# Patient Record
Sex: FEMALE | Race: WHITE | NOT HISPANIC OR LATINO | URBAN - METROPOLITAN AREA
[De-identification: names, ages, dates, MRNs, and addresses within clinical notes are randomized per-mention and may not be internally consistent; named-entity substitution may affect disease eponyms.]

---

## 2017-06-19 ENCOUNTER — OUTPATIENT (OUTPATIENT)
Dept: OUTPATIENT SERVICES | Facility: HOSPITAL | Age: 77
LOS: 1 days | Discharge: HOME | End: 2017-06-19

## 2017-06-19 DIAGNOSIS — K61.0 ANAL ABSCESS: ICD-10-CM

## 2017-06-19 DIAGNOSIS — I48.91 UNSPECIFIED ATRIAL FIBRILLATION: ICD-10-CM

## 2017-06-19 DIAGNOSIS — K62.5 HEMORRHAGE OF ANUS AND RECTUM: ICD-10-CM

## 2017-06-24 ENCOUNTER — EMERGENCY (EMERGENCY)
Facility: HOSPITAL | Age: 77
LOS: 0 days | Discharge: HOME | End: 2017-06-24

## 2017-06-24 DIAGNOSIS — K61.0 ANAL ABSCESS: ICD-10-CM

## 2017-06-24 DIAGNOSIS — I48.91 UNSPECIFIED ATRIAL FIBRILLATION: ICD-10-CM

## 2017-06-24 DIAGNOSIS — K62.5 HEMORRHAGE OF ANUS AND RECTUM: ICD-10-CM

## 2017-06-26 ENCOUNTER — EMERGENCY (EMERGENCY)
Facility: HOSPITAL | Age: 77
LOS: 0 days | Discharge: HOME | End: 2017-06-26
Admitting: INTERNAL MEDICINE

## 2017-06-26 DIAGNOSIS — K61.0 ANAL ABSCESS: ICD-10-CM

## 2017-06-26 DIAGNOSIS — Z02.9 ENCOUNTER FOR ADMINISTRATIVE EXAMINATIONS, UNSPECIFIED: ICD-10-CM

## 2017-06-26 DIAGNOSIS — K62.5 HEMORRHAGE OF ANUS AND RECTUM: ICD-10-CM

## 2017-06-26 DIAGNOSIS — I48.91 UNSPECIFIED ATRIAL FIBRILLATION: ICD-10-CM

## 2017-06-27 ENCOUNTER — INPATIENT (INPATIENT)
Facility: HOSPITAL | Age: 77
LOS: 2 days | Discharge: HOME | End: 2017-06-30
Attending: INTERNAL MEDICINE | Admitting: INTERNAL MEDICINE

## 2017-06-27 DIAGNOSIS — I48.91 UNSPECIFIED ATRIAL FIBRILLATION: ICD-10-CM

## 2017-06-27 DIAGNOSIS — K61.0 ANAL ABSCESS: ICD-10-CM

## 2017-06-27 DIAGNOSIS — K62.5 HEMORRHAGE OF ANUS AND RECTUM: ICD-10-CM

## 2017-06-28 DIAGNOSIS — Z02.9 ENCOUNTER FOR ADMINISTRATIVE EXAMINATIONS, UNSPECIFIED: ICD-10-CM

## 2017-06-28 DIAGNOSIS — D51.8 OTHER VITAMIN B12 DEFICIENCY ANEMIAS: ICD-10-CM

## 2017-06-28 DIAGNOSIS — E55.9 VITAMIN D DEFICIENCY, UNSPECIFIED: ICD-10-CM

## 2017-06-28 DIAGNOSIS — D53.9 NUTRITIONAL ANEMIA, UNSPECIFIED: ICD-10-CM

## 2017-06-28 DIAGNOSIS — E11.9 TYPE 2 DIABETES MELLITUS WITHOUT COMPLICATIONS: ICD-10-CM

## 2017-06-28 DIAGNOSIS — E78.5 HYPERLIPIDEMIA, UNSPECIFIED: ICD-10-CM

## 2017-06-28 DIAGNOSIS — E03.9 HYPOTHYROIDISM, UNSPECIFIED: ICD-10-CM

## 2017-06-28 DIAGNOSIS — N39.0 URINARY TRACT INFECTION, SITE NOT SPECIFIED: ICD-10-CM

## 2017-07-02 ENCOUNTER — INPATIENT (INPATIENT)
Facility: HOSPITAL | Age: 77
LOS: 4 days | Discharge: HOME | End: 2017-07-07
Attending: INTERNAL MEDICINE

## 2017-07-02 DIAGNOSIS — I48.91 UNSPECIFIED ATRIAL FIBRILLATION: ICD-10-CM

## 2017-07-02 DIAGNOSIS — K62.5 HEMORRHAGE OF ANUS AND RECTUM: ICD-10-CM

## 2017-07-02 DIAGNOSIS — K61.0 ANAL ABSCESS: ICD-10-CM

## 2017-07-05 DIAGNOSIS — E87.1 HYPO-OSMOLALITY AND HYPONATREMIA: ICD-10-CM

## 2017-07-05 DIAGNOSIS — I12.9 HYPERTENSIVE CHRONIC KIDNEY DISEASE WITH STAGE 1 THROUGH STAGE 4 CHRONIC KIDNEY DISEASE, OR UNSPECIFIED CHRONIC KIDNEY DISEASE: ICD-10-CM

## 2017-07-05 DIAGNOSIS — N39.0 URINARY TRACT INFECTION, SITE NOT SPECIFIED: ICD-10-CM

## 2017-07-05 DIAGNOSIS — Z79.01 LONG TERM (CURRENT) USE OF ANTICOAGULANTS: ICD-10-CM

## 2017-07-05 DIAGNOSIS — N17.9 ACUTE KIDNEY FAILURE, UNSPECIFIED: ICD-10-CM

## 2017-07-05 DIAGNOSIS — N18.3 CHRONIC KIDNEY DISEASE, STAGE 3 (MODERATE): ICD-10-CM

## 2017-07-05 DIAGNOSIS — R53.1 WEAKNESS: ICD-10-CM

## 2017-07-05 DIAGNOSIS — I48.91 UNSPECIFIED ATRIAL FIBRILLATION: ICD-10-CM

## 2017-07-05 DIAGNOSIS — Z90.710 ACQUIRED ABSENCE OF BOTH CERVIX AND UTERUS: ICD-10-CM

## 2017-07-05 DIAGNOSIS — Z88.6 ALLERGY STATUS TO ANALGESIC AGENT: ICD-10-CM

## 2017-07-05 DIAGNOSIS — K59.00 CONSTIPATION, UNSPECIFIED: ICD-10-CM

## 2017-07-05 DIAGNOSIS — N76.4 ABSCESS OF VULVA: ICD-10-CM

## 2017-07-05 DIAGNOSIS — Z98.1 ARTHRODESIS STATUS: ICD-10-CM

## 2017-07-05 DIAGNOSIS — L03.317 CELLULITIS OF BUTTOCK: ICD-10-CM

## 2017-07-05 DIAGNOSIS — K61.1 RECTAL ABSCESS: ICD-10-CM

## 2017-07-05 DIAGNOSIS — E86.9 VOLUME DEPLETION, UNSPECIFIED: ICD-10-CM

## 2017-07-05 DIAGNOSIS — L02.31 CUTANEOUS ABSCESS OF BUTTOCK: ICD-10-CM

## 2017-07-11 DIAGNOSIS — D64.9 ANEMIA, UNSPECIFIED: ICD-10-CM

## 2017-07-11 DIAGNOSIS — K62.5 HEMORRHAGE OF ANUS AND RECTUM: ICD-10-CM

## 2017-07-11 DIAGNOSIS — D12.2 BENIGN NEOPLASM OF ASCENDING COLON: ICD-10-CM

## 2017-07-11 DIAGNOSIS — I12.9 HYPERTENSIVE CHRONIC KIDNEY DISEASE WITH STAGE 1 THROUGH STAGE 4 CHRONIC KIDNEY DISEASE, OR UNSPECIFIED CHRONIC KIDNEY DISEASE: ICD-10-CM

## 2017-07-11 DIAGNOSIS — D12.0 BENIGN NEOPLASM OF CECUM: ICD-10-CM

## 2017-07-11 DIAGNOSIS — K64.4 RESIDUAL HEMORRHOIDAL SKIN TAGS: ICD-10-CM

## 2017-07-11 DIAGNOSIS — K59.00 CONSTIPATION, UNSPECIFIED: ICD-10-CM

## 2017-07-11 DIAGNOSIS — N18.3 CHRONIC KIDNEY DISEASE, STAGE 3 (MODERATE): ICD-10-CM

## 2017-07-11 DIAGNOSIS — E87.1 HYPO-OSMOLALITY AND HYPONATREMIA: ICD-10-CM

## 2017-07-11 DIAGNOSIS — I48.91 UNSPECIFIED ATRIAL FIBRILLATION: ICD-10-CM

## 2017-07-11 DIAGNOSIS — E03.9 HYPOTHYROIDISM, UNSPECIFIED: ICD-10-CM

## 2017-07-21 DIAGNOSIS — K64.8 OTHER HEMORRHOIDS: ICD-10-CM

## 2017-07-21 DIAGNOSIS — K92.2 GASTROINTESTINAL HEMORRHAGE, UNSPECIFIED: ICD-10-CM

## 2019-06-10 PROBLEM — Z00.00 ENCOUNTER FOR PREVENTIVE HEALTH EXAMINATION: Status: ACTIVE | Noted: 2019-06-10

## 2019-07-10 ENCOUNTER — APPOINTMENT (OUTPATIENT)
Dept: CARDIOLOGY | Facility: CLINIC | Age: 79
End: 2019-07-10

## 2019-08-01 ENCOUNTER — INPATIENT (INPATIENT)
Facility: HOSPITAL | Age: 79
LOS: 3 days | Discharge: HOME HEALTH | End: 2019-08-05
Attending: STUDENT IN AN ORGANIZED HEALTH CARE EDUCATION/TRAINING PROGRAM | Admitting: INTERNAL MEDICINE
Payer: MEDICARE

## 2019-08-01 VITALS
DIASTOLIC BLOOD PRESSURE: 76 MMHG | OXYGEN SATURATION: 97 % | TEMPERATURE: 98 F | HEART RATE: 88 BPM | RESPIRATION RATE: 16 BRPM | SYSTOLIC BLOOD PRESSURE: 139 MMHG

## 2019-08-01 DIAGNOSIS — E03.9 HYPOTHYROIDISM, UNSPECIFIED: ICD-10-CM

## 2019-08-01 DIAGNOSIS — I10 ESSENTIAL (PRIMARY) HYPERTENSION: ICD-10-CM

## 2019-08-01 DIAGNOSIS — M10.9 GOUT, UNSPECIFIED: ICD-10-CM

## 2019-08-01 DIAGNOSIS — I61.9 NONTRAUMATIC INTRACEREBRAL HEMORRHAGE, UNSPECIFIED: ICD-10-CM

## 2019-08-01 DIAGNOSIS — I48.91 UNSPECIFIED ATRIAL FIBRILLATION: ICD-10-CM

## 2019-08-01 DIAGNOSIS — N30.80 OTHER CYSTITIS WITHOUT HEMATURIA: ICD-10-CM

## 2019-08-01 LAB
ALBUMIN SERPL ELPH-MCNC: 3.5 G/DL — SIGNIFICANT CHANGE UP (ref 3.5–5.2)
ALP SERPL-CCNC: 90 U/L — SIGNIFICANT CHANGE UP (ref 30–115)
ALT FLD-CCNC: 11 U/L — SIGNIFICANT CHANGE UP (ref 0–41)
ANION GAP SERPL CALC-SCNC: 13 MMOL/L — SIGNIFICANT CHANGE UP (ref 7–14)
APPEARANCE UR: ABNORMAL
APTT BLD: 25.4 SEC — LOW (ref 27–39.2)
AST SERPL-CCNC: 19 U/L — SIGNIFICANT CHANGE UP (ref 0–41)
BACTERIA # UR AUTO: ABNORMAL /HPF
BASOPHILS # BLD AUTO: 0.03 K/UL — SIGNIFICANT CHANGE UP (ref 0–0.2)
BASOPHILS NFR BLD AUTO: 0.5 % — SIGNIFICANT CHANGE UP (ref 0–1)
BILIRUB SERPL-MCNC: 0.4 MG/DL — SIGNIFICANT CHANGE UP (ref 0.2–1.2)
BILIRUB UR-MCNC: NEGATIVE — SIGNIFICANT CHANGE UP
BUN SERPL-MCNC: 15 MG/DL — SIGNIFICANT CHANGE UP (ref 10–20)
CALCIUM SERPL-MCNC: 9.3 MG/DL — SIGNIFICANT CHANGE UP (ref 8.5–10.1)
CHLORIDE SERPL-SCNC: 101 MMOL/L — SIGNIFICANT CHANGE UP (ref 98–110)
CO2 SERPL-SCNC: 25 MMOL/L — SIGNIFICANT CHANGE UP (ref 17–32)
COD CRY URNS QL: NEGATIVE — SIGNIFICANT CHANGE UP
COLOR SPEC: YELLOW — SIGNIFICANT CHANGE UP
CREAT SERPL-MCNC: 1 MG/DL — SIGNIFICANT CHANGE UP (ref 0.7–1.5)
DIFF PNL FLD: ABNORMAL
EOSINOPHIL # BLD AUTO: 0.06 K/UL — SIGNIFICANT CHANGE UP (ref 0–0.7)
EOSINOPHIL NFR BLD AUTO: 0.9 % — SIGNIFICANT CHANGE UP (ref 0–8)
EPI CELLS # UR: ABNORMAL /HPF
GLUCOSE SERPL-MCNC: 99 MG/DL — SIGNIFICANT CHANGE UP (ref 70–99)
GLUCOSE UR QL: NEGATIVE MG/DL — SIGNIFICANT CHANGE UP
GRAN CASTS # UR COMP ASSIST: NEGATIVE — SIGNIFICANT CHANGE UP
HCT VFR BLD CALC: 40.8 % — SIGNIFICANT CHANGE UP (ref 37–47)
HGB BLD-MCNC: 13 G/DL — SIGNIFICANT CHANGE UP (ref 12–16)
HYALINE CASTS # UR AUTO: NEGATIVE — SIGNIFICANT CHANGE UP
IMM GRANULOCYTES NFR BLD AUTO: 0.3 % — SIGNIFICANT CHANGE UP (ref 0.1–0.3)
INR BLD: 1.03 RATIO — SIGNIFICANT CHANGE UP (ref 0.65–1.3)
KETONES UR-MCNC: ABNORMAL
LACTATE SERPL-SCNC: 1.1 MMOL/L — SIGNIFICANT CHANGE UP (ref 0.5–2.2)
LEUKOCYTE ESTERASE UR-ACNC: SIGNIFICANT CHANGE UP
LIDOCAIN IGE QN: 21 U/L — SIGNIFICANT CHANGE UP (ref 7–60)
LYMPHOCYTES # BLD AUTO: 1.72 K/UL — SIGNIFICANT CHANGE UP (ref 1.2–3.4)
LYMPHOCYTES # BLD AUTO: 26.7 % — SIGNIFICANT CHANGE UP (ref 20.5–51.1)
MCHC RBC-ENTMCNC: 30.2 PG — SIGNIFICANT CHANGE UP (ref 27–31)
MCHC RBC-ENTMCNC: 31.9 G/DL — LOW (ref 32–37)
MCV RBC AUTO: 94.7 FL — SIGNIFICANT CHANGE UP (ref 81–99)
MONOCYTES # BLD AUTO: 0.58 K/UL — SIGNIFICANT CHANGE UP (ref 0.1–0.6)
MONOCYTES NFR BLD AUTO: 9 % — SIGNIFICANT CHANGE UP (ref 1.7–9.3)
NEUTROPHILS # BLD AUTO: 4.02 K/UL — SIGNIFICANT CHANGE UP (ref 1.4–6.5)
NEUTROPHILS NFR BLD AUTO: 62.6 % — SIGNIFICANT CHANGE UP (ref 42.2–75.2)
NITRITE UR-MCNC: POSITIVE
NRBC # BLD: 0 /100 WBCS — SIGNIFICANT CHANGE UP (ref 0–0)
PH UR: 7 — SIGNIFICANT CHANGE UP (ref 5–8)
PLATELET # BLD AUTO: 184 K/UL — SIGNIFICANT CHANGE UP (ref 130–400)
POTASSIUM SERPL-MCNC: 4.1 MMOL/L — SIGNIFICANT CHANGE UP (ref 3.5–5)
POTASSIUM SERPL-SCNC: 4.1 MMOL/L — SIGNIFICANT CHANGE UP (ref 3.5–5)
PROT SERPL-MCNC: 6.8 G/DL — SIGNIFICANT CHANGE UP (ref 6–8)
PROT UR-MCNC: 100 MG/DL
PROTHROM AB SERPL-ACNC: 11.8 SEC — SIGNIFICANT CHANGE UP (ref 9.95–12.87)
RBC # BLD: 4.31 M/UL — SIGNIFICANT CHANGE UP (ref 4.2–5.4)
RBC # FLD: 14 % — SIGNIFICANT CHANGE UP (ref 11.5–14.5)
RBC CASTS # UR COMP ASSIST: SIGNIFICANT CHANGE UP /HPF
SODIUM SERPL-SCNC: 139 MMOL/L — SIGNIFICANT CHANGE UP (ref 135–146)
SP GR SPEC: 1.01 — SIGNIFICANT CHANGE UP (ref 1.01–1.03)
TRI-PHOS CRY UR QL COMP ASSIST: NEGATIVE — SIGNIFICANT CHANGE UP
URATE CRY FLD QL MICRO: NEGATIVE — SIGNIFICANT CHANGE UP
UROBILINOGEN FLD QL: 0.2 MG/DL — SIGNIFICANT CHANGE UP (ref 0.2–0.2)
WBC # BLD: 6.43 K/UL — SIGNIFICANT CHANGE UP (ref 4.8–10.8)
WBC # FLD AUTO: 6.43 K/UL — SIGNIFICANT CHANGE UP (ref 4.8–10.8)
WBC UR QL: ABNORMAL /HPF

## 2019-08-01 PROCEDURE — 70450 CT HEAD/BRAIN W/O DYE: CPT | Mod: 26

## 2019-08-01 PROCEDURE — 74176 CT ABD & PELVIS W/O CONTRAST: CPT | Mod: 26

## 2019-08-01 PROCEDURE — 99285 EMERGENCY DEPT VISIT HI MDM: CPT

## 2019-08-01 PROCEDURE — 99221 1ST HOSP IP/OBS SF/LOW 40: CPT

## 2019-08-01 RX ORDER — CEFTRIAXONE 500 MG/1
1000 INJECTION, POWDER, FOR SOLUTION INTRAMUSCULAR; INTRAVENOUS ONCE
Refills: 0 | Status: COMPLETED | OUTPATIENT
Start: 2019-08-01 | End: 2019-08-01

## 2019-08-01 RX ADMIN — CEFTRIAXONE 100 MILLIGRAM(S): 500 INJECTION, POWDER, FOR SOLUTION INTRAMUSCULAR; INTRAVENOUS at 20:53

## 2019-08-01 NOTE — ED PROVIDER NOTE - OBJECTIVE STATEMENT
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 Pt states this am she felt just generalized weakness and overall malaise. pt endorses mild intermittent dizziness and HA that is unchanged from stroke in march. Pt states doctor came yesterday and was concerned for UTI, she took a urine sample and started her on macrobid. pt took 1 dose today. pt denies f/c/n/v/d, abd pain, fall, cp, sob, urinary symptoms, new focal deficits. pt is nonambulatory at baseline.

## 2019-08-01 NOTE — H&P ADULT - HISTORY OF PRESENT ILLNESS
· HPI Objective Statement: The pt is a 79y Female with PMH hemorrhagic stroke in March with residual right sided deficits, HTN, hypothyroidism, afib on plavix is presenting to ED with generalized weakness x 1 day. Pt states this am she felt just generalized weakness and overall malaise. pt endorses mild intermittent dizziness and HA that is unchanged from stroke in march. Pt states doctor came yesterday and was concerned for UTI, she took a urine sample and started her on macrobid. pt took 1 dose today. pt denies f/c/n/v/d, abd pain, fall, cp, sob, urinary symptoms, new focal deficits. pt is nonambulatory at baseline.	    PAST MEDICAL/SURGICAL/FAMILY/SOCIAL HISTORY:    Past [facility]

## 2019-08-01 NOTE — H&P ADULT - ASSESSMENT
Patient is a 79y old  Female who presents with a chief complaint of    urinary symptoms/  general weakness                                                                                                                                                                                                                                                                                    HEALTH ISSUES - PROBLEM Dx:

## 2019-08-01 NOTE — ED PROVIDER NOTE - PHYSICAL EXAMINATION
GEN: Alert & Oriented x 3, No acute distress. Calm, appropriate.  Head and Neck: Normocephalic, atraumatic. No cervical lymphadenopathy.   ENT:Oral mucosa pink, moist without lesions.   Eyes: PERRL. EOMI. no pale conjunctiva. No conjunctival injection. No scleral icterus.   RESP: Lungs clear to auscult bilat. no wheezes, rhonchi or rales. No retractions. Equal air entry.  CARDIO: regular rate and rhythm, no murmurs, rubs or gallops. Normal S1, S2. Radial pulses 2+ bilaterally. No lower extremity edema.  ABD: Soft, Nondistended. No rebound tenderness/guarding.  No pulsatile mass. Tenderness with palpation to RLQ. no tenderness to LLQ/RUQ/LUQ.   SKIN: no rashes/lesions, no petechiae, no ecchymosis.  Neuro: A&O x3, CN II- XII intact, strength 5/5 throughout left extremities, decreased strength and movement to right upper and lower extremities at baseline. sensation intact . Speech & mentation intact. No drooping of eye or mouth. Without dysarthria or aphasia. Finger to nose intact left side.

## 2019-08-01 NOTE — ED PROVIDER NOTE - PROGRESS NOTE DETAILS
80yo F PMH noted, presents c/o weakness. Pt with a h/o hemorrhagic CVA 3/7/19 with residual right sided weakness. Today as per family she was sitting with a blank stare for a while, also today she had light dizziness and lower abdominal pain. Pt denies any fever, CP, SOB, nausea, vomiting. On exam pt in NAD, AAO x3, PERRL, EOMI, no lad, neck supple, OP clear, MMM, Lungs CTA B/L, no wrr, no mur, Abd is soft, + BS, NT ND, no edema, good ROM x all ext, no rash, steady gait

## 2019-08-01 NOTE — H&P ADULT - NSHPREVIEWOFSYSTEMS_GEN_ALL_CORE
view of Systems:  · Review of Systems: GEN: (-) fever, (-) night sweats, (+) malaise  	HEENT: (-) vision changes, (-) HA   	CV: (-) chest pain, (-) palpitations, (-) edema  	PULM: (-) cough, (-) wheezing, (-) dyspnea  	GI: (-) abdominal pain,(-) Nausea, (-) Vomiting, (-) Diarrhea, (-) Melena  	NEURO: (+) weakness, (-) paresthesias, (-) syncope, (-) seizure  	: (-) dysuria, (-) frequency, (-) urgency, (-) hematuria  	MS: (-) back pain, (-) joint pain, (-)myalgias, (-) swelling  	SKIN: (-) rashes, (-) new lesions, (-) pruritus, (-) jaundice  HEME: (-) bleeding, (-) ecchymosis

## 2019-08-01 NOTE — ED PROVIDER NOTE - ATTENDING CONTRIBUTION TO CARE
No
78yo F PMH noted including a fib, hemorrhagic CVA, (right sided residual weakness), HTN presents with family from home with c/o weakness. Today as per family she was sitting with a blank stare for a while, also today she had slight dizziness and lower abdominal pain. Pt denies any fever, CP, SOB, nausea, vomiting. Had urine tested yesterday by home care and told + UTI, took 1 dose of Macrobid,  On exam pt in NAD, AAO x3, PERRL, EOMI, no lad, neck supple, OP clear, MMM, Lungs CTA B/L, no wrr, no mur, Abd is soft, + BS, NT ND, no edema, good ROM x all ext, no rash,

## 2019-08-01 NOTE — H&P ADULT - NSICDXPASTMEDICALHX_GEN_ALL_CORE_FT
PAST MEDICAL HISTORY:  Afib     Gout     Hemorrhagic stroke     HTN (hypertension)     Hypothyroidism

## 2019-08-01 NOTE — ED PROVIDER NOTE - NS ED ROS FT
GEN: (-) fever, (-) night sweats, (+) malaise  HEENT: (-) vision changes, (-) HA   CV: (-) chest pain, (-) palpitations, (-) edema  PULM: (-) cough, (-) wheezing, (-) dyspnea  GI: (-) abdominal pain,(-) Nausea, (-) Vomiting, (-) Diarrhea, (-) Melena  NEURO: (+) weakness, (-) paresthesias, (-) syncope, (-) seizure  : (-) dysuria, (-) frequency, (-) urgency, (-) hematuria  MS: (-) back pain, (-) joint pain, (-)myalgias, (-) swelling  SKIN: (-) rashes, (-) new lesions, (-) pruritus, (-) jaundice  HEME: (-) bleeding, (-) ecchymosis

## 2019-08-02 LAB — TROPONIN T SERPL-MCNC: <0.01 NG/ML — SIGNIFICANT CHANGE UP

## 2019-08-02 PROCEDURE — 99233 SBSQ HOSP IP/OBS HIGH 50: CPT

## 2019-08-02 PROCEDURE — 76775 US EXAM ABDO BACK WALL LIM: CPT | Mod: 26

## 2019-08-02 RX ORDER — DOCUSATE SODIUM 100 MG
100 CAPSULE ORAL THREE TIMES A DAY
Refills: 0 | Status: DISCONTINUED | OUTPATIENT
Start: 2019-08-01 | End: 2019-08-05

## 2019-08-02 RX ORDER — FLECAINIDE ACETATE 50 MG
50 TABLET ORAL
Refills: 0 | Status: DISCONTINUED | OUTPATIENT
Start: 2019-08-01 | End: 2019-08-05

## 2019-08-02 RX ORDER — LOSARTAN POTASSIUM 100 MG/1
50 TABLET, FILM COATED ORAL
Refills: 0 | Status: DISCONTINUED | OUTPATIENT
Start: 2019-08-01 | End: 2019-08-05

## 2019-08-02 RX ORDER — SODIUM CHLORIDE 9 MG/ML
1000 INJECTION, SOLUTION INTRAVENOUS
Refills: 0 | Status: DISCONTINUED | OUTPATIENT
Start: 2019-08-02 | End: 2019-08-04

## 2019-08-02 RX ORDER — CEFTRIAXONE 500 MG/1
1000 INJECTION, POWDER, FOR SOLUTION INTRAMUSCULAR; INTRAVENOUS EVERY 24 HOURS
Refills: 0 | Status: DISCONTINUED | OUTPATIENT
Start: 2019-08-01 | End: 2019-08-04

## 2019-08-02 RX ORDER — AMLODIPINE BESYLATE 2.5 MG/1
5 TABLET ORAL DAILY
Refills: 0 | Status: DISCONTINUED | OUTPATIENT
Start: 2019-08-01 | End: 2019-08-05

## 2019-08-02 RX ORDER — ACETAMINOPHEN 500 MG
650 TABLET ORAL EVERY 6 HOURS
Refills: 0 | Status: DISCONTINUED | OUTPATIENT
Start: 2019-08-02 | End: 2019-08-05

## 2019-08-02 RX ORDER — HEPARIN SODIUM 5000 [USP'U]/ML
5000 INJECTION INTRAVENOUS; SUBCUTANEOUS EVERY 12 HOURS
Refills: 0 | Status: DISCONTINUED | OUTPATIENT
Start: 2019-08-01 | End: 2019-08-05

## 2019-08-02 RX ORDER — POLYETHYLENE GLYCOL 3350 17 G/17G
17 POWDER, FOR SOLUTION ORAL
Refills: 0 | Status: DISCONTINUED | OUTPATIENT
Start: 2019-08-02 | End: 2019-08-05

## 2019-08-02 RX ORDER — DOCUSATE SODIUM 100 MG
100 CAPSULE ORAL THREE TIMES A DAY
Refills: 0 | Status: DISCONTINUED | OUTPATIENT
Start: 2019-08-01 | End: 2019-08-02

## 2019-08-02 RX ORDER — SENNA PLUS 8.6 MG/1
2 TABLET ORAL AT BEDTIME
Refills: 0 | Status: DISCONTINUED | OUTPATIENT
Start: 2019-08-01 | End: 2019-08-05

## 2019-08-02 RX ORDER — LEVOTHYROXINE SODIUM 125 MCG
25 TABLET ORAL DAILY
Refills: 0 | Status: DISCONTINUED | OUTPATIENT
Start: 2019-08-01 | End: 2019-08-05

## 2019-08-02 RX ORDER — CLOPIDOGREL BISULFATE 75 MG/1
75 TABLET, FILM COATED ORAL DAILY
Refills: 0 | Status: DISCONTINUED | OUTPATIENT
Start: 2019-08-01 | End: 2019-08-05

## 2019-08-02 RX ORDER — HYDRALAZINE HCL 50 MG
25 TABLET ORAL AT BEDTIME
Refills: 0 | Status: DISCONTINUED | OUTPATIENT
Start: 2019-08-01 | End: 2019-08-05

## 2019-08-02 RX ORDER — HYDRALAZINE HCL 50 MG
50 TABLET ORAL DAILY
Refills: 0 | Status: DISCONTINUED | OUTPATIENT
Start: 2019-08-01 | End: 2019-08-05

## 2019-08-02 RX ORDER — PANTOPRAZOLE SODIUM 20 MG/1
40 TABLET, DELAYED RELEASE ORAL
Refills: 0 | Status: DISCONTINUED | OUTPATIENT
Start: 2019-08-02 | End: 2019-08-05

## 2019-08-02 RX ORDER — SENNA PLUS 8.6 MG/1
2 TABLET ORAL AT BEDTIME
Refills: 0 | Status: DISCONTINUED | OUTPATIENT
Start: 2019-08-01 | End: 2019-08-02

## 2019-08-02 RX ADMIN — Medication 50 MILLIGRAM(S): at 18:31

## 2019-08-02 RX ADMIN — Medication 100 MILLIGRAM(S): at 21:16

## 2019-08-02 RX ADMIN — LOSARTAN POTASSIUM 50 MILLIGRAM(S): 100 TABLET, FILM COATED ORAL at 18:32

## 2019-08-02 RX ADMIN — Medication 25 MICROGRAM(S): at 06:33

## 2019-08-02 RX ADMIN — POLYETHYLENE GLYCOL 3350 17 GRAM(S): 17 POWDER, FOR SOLUTION ORAL at 23:05

## 2019-08-02 RX ADMIN — PANTOPRAZOLE SODIUM 40 MILLIGRAM(S): 20 TABLET, DELAYED RELEASE ORAL at 14:17

## 2019-08-02 RX ADMIN — AMLODIPINE BESYLATE 5 MILLIGRAM(S): 2.5 TABLET ORAL at 06:33

## 2019-08-02 RX ADMIN — LOSARTAN POTASSIUM 50 MILLIGRAM(S): 100 TABLET, FILM COATED ORAL at 06:33

## 2019-08-02 RX ADMIN — HEPARIN SODIUM 5000 UNIT(S): 5000 INJECTION INTRAVENOUS; SUBCUTANEOUS at 18:33

## 2019-08-02 RX ADMIN — HEPARIN SODIUM 5000 UNIT(S): 5000 INJECTION INTRAVENOUS; SUBCUTANEOUS at 06:33

## 2019-08-02 RX ADMIN — Medication 50 MILLIGRAM(S): at 06:33

## 2019-08-02 RX ADMIN — Medication 100 MILLIGRAM(S): at 14:17

## 2019-08-02 RX ADMIN — SODIUM CHLORIDE 75 MILLILITER(S): 9 INJECTION, SOLUTION INTRAVENOUS at 06:37

## 2019-08-02 RX ADMIN — Medication 100 MILLIGRAM(S): at 06:33

## 2019-08-02 RX ADMIN — Medication 25 MILLIGRAM(S): at 21:17

## 2019-08-02 RX ADMIN — Medication 650 MILLIGRAM(S): at 11:22

## 2019-08-02 RX ADMIN — SODIUM CHLORIDE 75 MILLILITER(S): 9 INJECTION, SOLUTION INTRAVENOUS at 21:16

## 2019-08-02 RX ADMIN — CEFTRIAXONE 100 MILLIGRAM(S): 500 INJECTION, POWDER, FOR SOLUTION INTRAMUSCULAR; INTRAVENOUS at 06:35

## 2019-08-02 RX ADMIN — SENNA PLUS 2 TABLET(S): 8.6 TABLET ORAL at 21:16

## 2019-08-02 RX ADMIN — CLOPIDOGREL BISULFATE 75 MILLIGRAM(S): 75 TABLET, FILM COATED ORAL at 11:22

## 2019-08-02 RX ADMIN — POLYETHYLENE GLYCOL 3350 17 GRAM(S): 17 POWDER, FOR SOLUTION ORAL at 14:17

## 2019-08-02 NOTE — CONSULT NOTE ADULT - SUBJECTIVE AND OBJECTIVE BOX
CARDIOLOGY CONSULT NOTE     CHIEF COMPLAINT/REASON FOR CONSULT:    HPI:  · HPI Objective Statement: The pt is a 79y Female with PMH hemorrhagic stroke in March with residual right sided deficits, HTN, hypothyroidism, afib on plavix is presenting to ED with generalized weakness x 1 day. Pt states this am she felt just generalized weakness and overall malaise. pt endorses mild intermittent dizziness and HA that is unchanged from stroke in march. Pt states doctor came yesterday and was concerned for UTI, she took a urine sample and started her on macrobid. pt took 1 dose today. pt denies f/c/n/v/d, abd pain, fall, cp, sob, urinary symptoms, new focal deficits. pt is nonambulatory at baseline.	    PAST MEDICAL/SURGICAL/FAMILY/SOCIAL HISTORY:    [facility] (01 Aug 2019 23:12)      PAST MEDICAL & SURGICAL HISTORY:  Hemorrhagic stroke  Afib  Hypothyroidism  Gout  HTN (hypertension)  No significant past surgical history      Cardiac Risks:   [x ]HTN, [ ] DM, [ ] Smoking, [ ] FH,  [ ] Lipids        MEDICATIONS:  MEDICATIONS  (STANDING):  amLODIPine   Tablet 5 milliGRAM(s) Oral daily  cefTRIAXone   IVPB 1000 milliGRAM(s) IV Intermittent every 24 hours  clopidogrel Tablet 75 milliGRAM(s) Oral daily  dextrose 5% + sodium chloride 0.45%. 1000 milliLiter(s) (75 mL/Hr) IV Continuous <Continuous>  docusate sodium 100 milliGRAM(s) Oral three times a day  flecainide 50 milliGRAM(s) Oral two times a day  heparin  Injectable 5000 Unit(s) SubCutaneous every 12 hours  hydrALAZINE 50 milliGRAM(s) Oral daily  hydrALAZINE 25 milliGRAM(s) Oral at bedtime  levothyroxine 25 MICROGram(s) Oral daily  losartan 50 milliGRAM(s) Oral two times a day  senna 2 Tablet(s) Oral at bedtime      FAMILY HISTORY:      SOCIAL HISTORY:      [ ] Marital status   Allergies    amiodarone (Unknown)  Levaquin (Short breath; Rash)  morphine (Other)        	    REVIEW OF SYSTEMS:  CONSTITUTIONAL: No fever, weight loss, or fatigue  EYES: No eye pain, visual disturbances, or discharge  ENMT:  No difficulty hearing, tinnitus, vertigo; No sinus or throat pain  NECK: No pain or stiffness  RESPIRATORY: No cough, wheezing, chills or hemoptysis; No Shortness of Breath  CARDIOVASCULAR: No chest pain, palpitations, passing out, dizziness, or leg swelling  GASTROINTESTINAL: No abdominal or epigastric pain. No nausea, vomiting, or hematemesis; No diarrhea or constipation. No melena or hematochezia.  GENITOURINARY: No dysuria, frequency, hematuria, or incontinence  NEUROLOGICAL: No headaches, memory loss, loss of strength, numbness, or tremors  SKIN: No itching, burning, rashes, or lesions   	        PHYSICAL EXAM:  T(C): 36.8 (08-02-19 @ 04:53), Max: 36.9 (08-01-19 @ 17:48)  HR: 71 (08-02-19 @ 04:53) (70 - 88)  BP: 131/67 (08-02-19 @ 04:53) (131/67 - 160/90)  RR: 16 (08-02-19 @ 04:53) (16 - 18)  SpO2: 98% (08-01-19 @ 22:10) (97% - 98%)  Wt(kg): --  I&O's Summary      Appearance: Normal	  Psychiatry: A & O x 3, Mood & affect appropriate  HEENT:   Normal oral mucosa, PERRL, EOMI	  Lymphatic: No lymphadenopathy  Cardiovascular: Normal S1 S2,RRR, No JVD, No murmurs  Respiratory: Lungs clear to auscultation	  Gastrointestinal:  Soft, Non-tender, + BS	  Skin: No rashes, No ecchymoses, No cyanosis	  Neurologic: Non-focal  Extremities: Normal range of motion, No clubbing, cyanosis or edema  Vascular: Peripheral pulses palpable 2+ bilaterally      ECG:  	not available    	  LABS:	 	    CARDIAC MARKERS:                                    13.0   6.43  )-----------( 184      ( 01 Aug 2019 18:56 )             40.8     08-01    139  |  101  |  15  ----------------------------<  99  4.1   |  25  |  1.0    Ca    9.3      01 Aug 2019 18:56    TPro  6.8  /  Alb  3.5  /  TBili  0.4  /  DBili  x   /  AST  19  /  ALT  11  /  AlkPhos  90  08-01    PT/INR - ( 01 Aug 2019 18:56 )   PT: 11.80 sec;   INR: 1.03 ratio         PTT - ( 01 Aug 2019 18:56 )  PTT:25.4 sec

## 2019-08-02 NOTE — PHYSICAL THERAPY INITIAL EVALUATION ADULT - GENERAL OBSERVATIONS, REHAB EVAL
13:45-14:15. Chart reviewed; confirmed with RN to see the pt for PT. Pt ready to be seen for PT. Pt encountered in bed with no complain of pain and in no apparent distress. + IV L UE. Agreeable for PT evaluation.

## 2019-08-02 NOTE — PROGRESS NOTE ADULT - SUBJECTIVE AND OBJECTIVE BOX
Progress Note:  Provider Speciality                            Hospitalist      TUSHAR COLE [id number] 79y Female     CHIEF PRESENTING COMPLAINT:  Patient is a 79y old  Female who presents with a chief complaint of       SUBJECTIVE:  Patient was seen and examined at bedside.  c/w dyspepsia symptoms/ episodic chest pressure - has h/o of it/ inability to urinate in the morning but later voided independently/ constipation    No significant overnight events reported.     HISTORY OF PRESENTING ILLNESS:  HPI:  · HPI Objective Statement: The pt is a 79y Female with PMH hemorrhagic stroke in March with residual right sided deficits, HTN, hypothyroidism, afib on plavix is presenting to ED with generalized weakness x 1 day. Pt states this am she felt just generalized weakness and overall malaise. pt endorses mild intermittent dizziness and HA that is unchanged from stroke in march. Pt states doctor came yesterday and was concerned for UTI, she took a urine sample and started her on macrobid. pt took 1 dose today. pt denies f/c/n/v/d, abd pain, fall, cp, sob, urinary symptoms, new focal deficits. pt is nonambulatory at baseline.	    PAST MEDICAL/SURGICAL/FAMILY/SOCIAL HISTORY:    Past Medica (01 Aug 2019 23:12)      PAST MEDICAL & SURGICAL HISTORY:  PAST MEDICAL & SURGICAL HISTORY:  Hemorrhagic stroke  Afib  Hypothyroidism  Gout  HTN (hypertension)  No significant past surgical history      VITAL SIGNS:  Vital Signs Last 24 Hrs  T(C): 36.1 (02 Aug 2019 14:25), Max: 36.9 (01 Aug 2019 17:48)  T(F): 96.9 (02 Aug 2019 14:25), Max: 98.4 (01 Aug 2019 17:48)  HR: 72 (02 Aug 2019 14:25) (70 - 88)  BP: 109/56 (02 Aug 2019 14:25) (109/56 - 160/90)  BP(mean): --  RR: 16 (02 Aug 2019 14:25) (16 - 18)  SpO2: 96% (02 Aug 2019 10:39) (96% - 98%)    PHYSICAL EXAMINATION:  General: Awake  , alert  , Not in acute distress  HEENT:  JESU, EOMI  Heart: S1+S2 audible, no murmur  Lungs: bilateral  fair air entry, no wheezing, no crepitations.  Abdomen: Soft, non-tender, non-distended  CNS:  right sided weakness from prior CVA   Extremities:  No edema          REVIEW OF SYSTEMS:    At least 10 systems were reviewed in ROS. All systems reviewed  are within normal limits except for the complaints as described in Subjective.    CONSULTS:  Consultant(s) Notes Reviewed by me.   Care Discussed with Consultants/Other Providers where required.        MEDICATIONS:  MEDICATIONS  (STANDING):  amLODIPine   Tablet 5 milliGRAM(s) Oral daily  cefTRIAXone   IVPB 1000 milliGRAM(s) IV Intermittent every 24 hours  clopidogrel Tablet 75 milliGRAM(s) Oral daily  dextrose 5% + sodium chloride 0.45%. 1000 milliLiter(s) (75 mL/Hr) IV Continuous <Continuous>  docusate sodium 100 milliGRAM(s) Oral three times a day  flecainide 50 milliGRAM(s) Oral two times a day  heparin  Injectable 5000 Unit(s) SubCutaneous every 12 hours  hydrALAZINE 50 milliGRAM(s) Oral daily  hydrALAZINE 25 milliGRAM(s) Oral at bedtime  levothyroxine 25 MICROGram(s) Oral daily  losartan 50 milliGRAM(s) Oral two times a day  pantoprazole    Tablet 40 milliGRAM(s) Oral before breakfast  polyethylene glycol 3350 17 Gram(s) Oral two times a day  senna 2 Tablet(s) Oral at bedtime    MEDICATIONS  (PRN):  acetaminophen   Tablet .. 650 milliGRAM(s) Oral every 6 hours PRN Temp greater or equal to 38C (100.4F), Mild Pain (1 - 3)  aluminum hydroxide/magnesium hydroxide/simethicone Suspension 30 milliLiter(s) Oral every 6 hours PRN Dyspepsia  bisacodyl Suppository 10 milliGRAM(s) Rectal daily PRN Constipation      [facility] O's:                        13.0   6.43  )-----------( 184      ( 01 Aug 2019 18:56 )             40.8     08-    139  |  101  |  15  ----------------------------<  99  4.1   |  25  |  1.0    Ca    9.3      01 Aug 2019 18:56    TPro  6.8  /  Alb  3.5  /  TBili  0.4  /  DBili  x   /  AST  19  /  ALT  11  /  AlkPhos  90      PT/INR - ( 01 Aug 2019 18:56 )   PT: 11.80 sec;   INR: 1.03 ratio         PTT - ( 01 Aug 2019 18:56 )  PTT:25.4 sec  Urinalysis Basic - ( 01 Aug 2019 18:56 )    Color: Yellow / Appearance: Cloudy / S.015 / pH: x  Gluc: x / Ketone: Trace  / Bili: Negative / Urobili: 0.2 mg/dL   Blood: x / Protein: 100 mg/dL / Nitrite: Positive   Leuk Esterase: Large / RBC: 1-2 /HPF / WBC 10-25 /HPF   Sq Epi: x / Non Sq Epi: Few /HPF / Bacteria: TNTC /HPF      CAPILLARY BLOOD GLUCOSE            Urinalysis Basic - ( 01 Aug 2019 18:56 )    Color: Yellow / Appearance: Cloudy / S.015 / pH: x  Gluc: x / Ketone: Trace  / Bili: Negative / Urobili: 0.2 mg/dL   Blood: x / Protein: 100 mg/dL / Nitrite: Positive   Leuk Esterase: Large / RBC: 1-2 /HPF / WBC 10-25 /HPF   Sq Epi: x / Non Sq Epi: Few /HPF / Bacteria: TNTC /HPF                    ASSESSMENT:     The pt is a 79y Female with PMH hemorrhagic stroke in March with residual right sided deficits, HTN, hypothyroidism, afib on xarelto  is presenting to ED with generalized weakness with dizziness for one day.     Emphysematous cystitis/UTI   A-fib on xarelto   vague epigastric/chest pressure with dyspepsia like symptoms  h/o hemorrhagic CVA with residual right sided weakness  HTN   Hypothyroidism   constipation     Plan:    c/w IV ABX , rocephin   fu UCX  monitor for voiding- if unable to void again - would consider urology consult for possible bladder irrigation with emphysematous cystitis on CT abd/pelvis   Cardio eval appreciated - EKG noted - no acute changes  continue to hold xarelto as per Cardio with hemorrhagic CVA h/o   c/w hydrlazine/ losartan / norvasc for HTN   c/w flecainide for HR control   bowel regimen    #Progress Note Handoff  Pending :    Disposition:    Discussion: Discussed with patient  in AM rounds Progress Note:  Provider Speciality                            Hospitalist      TUSHAR COLE [id number] 79y Female     CHIEF PRESENTING COMPLAINT:  Patient is a 79y old  Female who presents with a chief complaint of gen.  weakness.       SUBJECTIVE:  Patient was seen and examined at bedside.  c/w dyspepsia symptoms/ episodic chest pressure - has h/o of it/ inability to urinate in the morning but later voided independently/ constipation    No significant overnight events reported.     HISTORY OF PRESENTING ILLNESS:  HPI:  · HPI Objective Statement: The pt is a 79y Female with PMH hemorrhagic stroke in March with residual right sided deficits, HTN, hypothyroidism, afib on plavix is presenting to ED with generalized weakness x 1 day. Pt states this am she felt just generalized weakness and overall malaise. pt endorses mild intermittent dizziness and HA that is unchanged from stroke in march. Pt states doctor came yesterday and was concerned for UTI, she took a urine sample and started her on macrobid. pt took 1 dose today. pt denies f/c/n/v/d, abd pain, fall, cp, sob, urinary symptoms, new focal deficits. pt is nonambulatory at baseline.	    PAST MEDICAL/SURGICAL/FAMILY/SOCIAL HISTORY:    Past Medica (01 Aug 2019 23:12)      PAST MEDICAL & SURGICAL HISTORY:  PAST MEDICAL & SURGICAL HISTORY:  Hemorrhagic stroke  Afib  Hypothyroidism  Gout  HTN (hypertension)  No significant past surgical history      VITAL SIGNS:  Vital Signs Last 24 Hrs  T(C): 36.1 (02 Aug 2019 14:25), Max: 36.9 (01 Aug 2019 17:48)  T(F): 96.9 (02 Aug 2019 14:25), Max: 98.4 (01 Aug 2019 17:48)  HR: 72 (02 Aug 2019 14:25) (70 - 88)  BP: 109/56 (02 Aug 2019 14:25) (109/56 - 160/90)  BP(mean): --  RR: 16 (02 Aug 2019 14:25) (16 - 18)  SpO2: 96% (02 Aug 2019 10:39) (96% - 98%)    PHYSICAL EXAMINATION:  General: Awake  , alert  , Not in acute distress  HEENT:  JESU, EOMI  Heart: S1+S2 audible, no murmur  Lungs: bilateral  fair air entry, no wheezing, no crepitations.  Abdomen: Soft, non-tender, non-distended  CNS:  right sided weakness from prior CVA   Extremities:  No edema          REVIEW OF SYSTEMS:    At least 10 systems were reviewed in ROS. All systems reviewed  are within normal limits except for the complaints as described in Subjective.    CONSULTS:  Consultant(s) Notes Reviewed by me.   Care Discussed with Consultants/Other Providers where required.        MEDICATIONS:  MEDICATIONS  (STANDING):  amLODIPine   Tablet 5 milliGRAM(s) Oral daily  cefTRIAXone   IVPB 1000 milliGRAM(s) IV Intermittent every 24 hours  clopidogrel Tablet 75 milliGRAM(s) Oral daily  dextrose 5% + sodium chloride 0.45%. 1000 milliLiter(s) (75 mL/Hr) IV Continuous <Continuous>  docusate sodium 100 milliGRAM(s) Oral three times a day  flecainide 50 milliGRAM(s) Oral two times a day  heparin  Injectable 5000 Unit(s) SubCutaneous every 12 hours  hydrALAZINE 50 milliGRAM(s) Oral daily  hydrALAZINE 25 milliGRAM(s) Oral at bedtime  levothyroxine 25 MICROGram(s) Oral daily  losartan 50 milliGRAM(s) Oral two times a day  pantoprazole    Tablet 40 milliGRAM(s) Oral before breakfast  polyethylene glycol 3350 17 Gram(s) Oral two times a day  senna 2 Tablet(s) Oral at bedtime    MEDICATIONS  (PRN):  acetaminophen   Tablet .. 650 milliGRAM(s) Oral every 6 hours PRN Temp greater or equal to 38C (100.4F), Mild Pain (1 - 3)  aluminum hydroxide/magnesium hydroxide/simethicone Suspension 30 milliLiter(s) Oral every 6 hours PRN Dyspepsia  bisacodyl Suppository 10 milliGRAM(s) Rectal daily PRN Constipation      LABOROTORY DATA/MICROBIOLOGY/I & O's:                        13.0   6.43  )-----------( 184      ( 01 Aug 2019 18:56 )             40.8     08-    139  |  101  |  15  ----------------------------<  99  4.1   |  25  |  1.0    Ca    9.3      01 Aug 2019 18:56    TPro  6.8  /  Alb  3.5  /  TBili  0.4  /  DBili  x   /  AST  19  /  ALT  11  /  AlkPhos  90      PT/INR - ( 01 Aug 2019 18:56 )   PT: 11.80 sec;   INR: 1.03 ratio         PTT - ( 01 Aug 2019 18:56 )  PTT:25.4 sec  Urinalysis Basic - ( 01 Aug 2019 18:56 )    Color: Yellow / Appearance: Cloudy / S.015 / pH: x  Gluc: x / Ketone: Trace  / Bili: Negative / Urobili: 0.2 mg/dL   Blood: x / Protein: 100 mg/dL / Nitrite: Positive   Leuk Esterase: Large / RBC: 1-2 /HPF / WBC 10-25 /HPF   Sq Epi: x / Non Sq Epi: Few /HPF / Bacteria: TNTC /HPF      CAPILLARY BLOOD GLUCOSE            Urinalysis Basic - ( 01 Aug 2019 18:56 )    Color: Yellow / Appearance: Cloudy / S.015 / pH: x  Gluc: x / Ketone: Trace  / Bili: Negative / Urobili: 0.2 mg/dL   Blood: x / Protein: 100 mg/dL / Nitrite: Positive   Leuk Esterase: Large / RBC: 1-2 /HPF / WBC 10-25 /HPF   Sq Epi: x / Non Sq Epi: Few /HPF / Bacteria: TNTC /HPF                    ASSESSMENT:     The pt is a 79y Female with PMH hemorrhagic stroke in March with residual right sided deficits, HTN, hypothyroidism, afib on xarelto  is presenting to ED with generalized weakness with dizziness for one day.     Emphysematous cystitis/UTI   A-fib on xarelto   vague epigastric/chest pressure with dyspepsia like symptoms  h/o hemorrhagic CVA with residual right sided weakness  HTN   Hypothyroidism   constipation     Plan:    c/w IV ABX , rocephin   fu UCX  monitor for voiding- if unable to void again - would consider urology consult for possible bladder irrigation with emphysematous cystitis on CT abd/pelvis   Cardio eval appreciated - EKG noted - no acute changes  continue to hold xarelto as per Cardio with hemorrhagic CVA h/o   c/w hydralazine losartan / norvasc for HTN   c/w flecainide for HR control   bowel regimen  c/w synthroid     #Progress Note Handoff  Pending :  clinical improvement/UCX  Disposition:  home when stable   Discussion: Discussed with patient  and  at bedside- satisfied Progress Note:  Provider Speciality                            Hospitalist      TUSHAR COLE [id number] 79y Female     CHIEF PRESENTING COMPLAINT:  Patient is a 79y old  Female who presents with a chief complaint of gen.  weakness.       SUBJECTIVE:  Patient was seen and examined at bedside.  c/w dyspepsia symptoms/ episodic chest pressure - has h/o of it/ inability to urinate in the morning but later voided independently/ constipation    No significant overnight events reported.     HISTORY OF PRESENTING ILLNESS:  HPI:  · HPI Objective Statement: The pt is a 79y Female with PMH hemorrhagic stroke in March with residual right sided deficits, HTN, hypothyroidism, afib on plavix is presenting to ED with generalized weakness x 1 day. Pt states this am she felt just generalized weakness and overall malaise. pt endorses mild intermittent dizziness and HA that is unchanged from stroke in march. Pt states doctor came yesterday and was concerned for UTI, she took a urine sample and started her on macrobid. pt took 1 dose today. pt denies f/c/n/v/d, abd pain, fall, cp, sob, urinary symptoms, new focal deficits. pt is nonambulatory at baseline.	    PAST MEDICAL/SURGICAL/FAMILY/SOCIAL HISTORY:    Past Medica (01 Aug 2019 23:12)      PAST MEDICAL & SURGICAL HISTORY:  PAST MEDICAL & SURGICAL HISTORY:  Hemorrhagic stroke  Afib  Hypothyroidism  Gout  HTN (hypertension)  No significant past surgical history      VITAL SIGNS:  Vital Signs Last 24 Hrs  T(C): 36.1 (02 Aug 2019 14:25), Max: 36.9 (01 Aug 2019 17:48)  T(F): 96.9 (02 Aug 2019 14:25), Max: 98.4 (01 Aug 2019 17:48)  HR: 72 (02 Aug 2019 14:25) (70 - 88)  BP: 109/56 (02 Aug 2019 14:25) (109/56 - 160/90)  BP(mean): --  RR: 16 (02 Aug 2019 14:25) (16 - 18)  SpO2: 96% (02 Aug 2019 10:39) (96% - 98%)    PHYSICAL EXAMINATION:  General: Awake  , alert  , Not in acute distress  HEENT:  JESU, EOMI  Heart: S1+S2 audible, no murmur  Lungs: bilateral  fair air entry, no wheezing, no crepitations.  Abdomen: Soft, non-tender, non-distended  CNS:  right sided weakness from prior CVA   Extremities:  No edema          REVIEW OF SYSTEMS:    At least 10 systems were reviewed in ROS. All systems reviewed  are within normal limits except for the complaints as described in Subjective.    CONSULTS:  Consultant(s) Notes Reviewed by me.   Care Discussed with Consultants/Other Providers where required.        MEDICATIONS:  MEDICATIONS  (STANDING):  amLODIPine   Tablet 5 milliGRAM(s) Oral daily  cefTRIAXone   IVPB 1000 milliGRAM(s) IV Intermittent every 24 hours  clopidogrel Tablet 75 milliGRAM(s) Oral daily  dextrose 5% + sodium chloride 0.45%. 1000 milliLiter(s) (75 mL/Hr) IV Continuous <Continuous>  docusate sodium 100 milliGRAM(s) Oral three times a day  flecainide 50 milliGRAM(s) Oral two times a day  heparin  Injectable 5000 Unit(s) SubCutaneous every 12 hours  hydrALAZINE 50 milliGRAM(s) Oral daily  hydrALAZINE 25 milliGRAM(s) Oral at bedtime  levothyroxine 25 MICROGram(s) Oral daily  losartan 50 milliGRAM(s) Oral two times a day  pantoprazole    Tablet 40 milliGRAM(s) Oral before breakfast  polyethylene glycol 3350 17 Gram(s) Oral two times a day  senna 2 Tablet(s) Oral at bedtime    MEDICATIONS  (PRN):  acetaminophen   Tablet .. 650 milliGRAM(s) Oral every 6 hours PRN Temp greater or equal to 38C (100.4F), Mild Pain (1 - 3)  aluminum hydroxide/magnesium hydroxide/simethicone Suspension 30 milliLiter(s) Oral every 6 hours PRN Dyspepsia  bisacodyl Suppository 10 milliGRAM(s) Rectal daily PRN Constipation      LABOROTORY DATA/MICROBIOLOGY/I & O's:                        13.0   6.43  )-----------( 184      ( 01 Aug 2019 18:56 )             40.8     08-    139  |  101  |  15  ----------------------------<  99  4.1   |  25  |  1.0    Ca    9.3      01 Aug 2019 18:56    TPro  6.8  /  Alb  3.5  /  TBili  0.4  /  DBili  x   /  AST  19  /  ALT  11  /  AlkPhos  90      PT/INR - ( 01 Aug 2019 18:56 )   PT: 11.80 sec;   INR: 1.03 ratio         PTT - ( 01 Aug 2019 18:56 )  PTT:25.4 sec  Urinalysis Basic - ( 01 Aug 2019 18:56 )    Color: Yellow / Appearance: Cloudy / S.015 / pH: x  Gluc: x / Ketone: Trace  / Bili: Negative / Urobili: 0.2 mg/dL   Blood: x / Protein: 100 mg/dL / Nitrite: Positive   Leuk Esterase: Large / RBC: 1-2 /HPF / WBC 10-25 /HPF   Sq Epi: x / Non Sq Epi: Few /HPF / Bacteria: TNTC /HPF      CAPILLARY BLOOD GLUCOSE            Urinalysis Basic - ( 01 Aug 2019 18:56 )    Color: Yellow / Appearance: Cloudy / S.015 / pH: x  Gluc: x / Ketone: Trace  / Bili: Negative / Urobili: 0.2 mg/dL   Blood: x / Protein: 100 mg/dL / Nitrite: Positive   Leuk Esterase: Large / RBC: 1-2 /HPF / WBC 10-25 /HPF   Sq Epi: x / Non Sq Epi: Few /HPF / Bacteria: TNTC /HPF                    ASSESSMENT:     The pt is a 79y Female with PMH hemorrhagic stroke in March with residual right sided deficits, HTN, hypothyroidism, afib on xarelto  is presenting to ED with generalized weakness with dizziness for one day.     Emphysematous cystitis/UTI   A-fib on xarelto   vague epigastric/chest pressure with dyspepsia like symptoms  h/o hemorrhagic CVA with residual right sided weakness  HTN   Hypothyroidism   constipation     Plan:    c/w IV ABX , rocephin   fu UCX  monitor for voiding- if unable to void again - would consider urology consult for possible bladder irrigation with emphysematous cystitis on CT abd/pelvis   Cardio eval appreciated - EKG noted - no acute changes  check trop at 3pm   maalox prn for dyspepsia  continue to hold xarelto as per Cardio with hemorrhagic CVA h/o   c/w hydralazine losartan / norvasc for HTN   c/w flecainide for HR control   bowel regimen  c/w synthroid     #Progress Note Handoff  Pending :  clinical improvement  Disposition:  home when stable   Discussion: Discussed with patient  and  at bedside- satisfied

## 2019-08-02 NOTE — CONSULT NOTE ADULT - ASSESSMENT
Patient with afib. She was on xarelto. hemorrhagic stroke in March. Now off xarelto. Here uti. She needs a ekg . Continue meeds. No xarelto for now. Prognosis guarded

## 2019-08-02 NOTE — PHYSICAL THERAPY INITIAL EVALUATION ADULT - PLANNED THERAPY INTERVENTIONS, PT EVAL
bed mobility training/balance training/strengthening/neuromuscular re-education/ROM/gait training/transfer training

## 2019-08-03 LAB
ANION GAP SERPL CALC-SCNC: 16 MMOL/L — HIGH (ref 7–14)
BASOPHILS # BLD AUTO: 0.02 K/UL — SIGNIFICANT CHANGE UP (ref 0–0.2)
BASOPHILS NFR BLD AUTO: 0.4 % — SIGNIFICANT CHANGE UP (ref 0–1)
BUN SERPL-MCNC: 13 MG/DL — SIGNIFICANT CHANGE UP (ref 10–20)
CALCIUM SERPL-MCNC: 8.9 MG/DL — SIGNIFICANT CHANGE UP (ref 8.5–10.1)
CHLORIDE SERPL-SCNC: 103 MMOL/L — SIGNIFICANT CHANGE UP (ref 98–110)
CO2 SERPL-SCNC: 20 MMOL/L — SIGNIFICANT CHANGE UP (ref 17–32)
CREAT SERPL-MCNC: 0.8 MG/DL — SIGNIFICANT CHANGE UP (ref 0.7–1.5)
EOSINOPHIL # BLD AUTO: 0.16 K/UL — SIGNIFICANT CHANGE UP (ref 0–0.7)
EOSINOPHIL NFR BLD AUTO: 3.4 % — SIGNIFICANT CHANGE UP (ref 0–8)
GLUCOSE SERPL-MCNC: 140 MG/DL — HIGH (ref 70–99)
HCT VFR BLD CALC: 41.5 % — SIGNIFICANT CHANGE UP (ref 37–47)
HGB BLD-MCNC: 13.1 G/DL — SIGNIFICANT CHANGE UP (ref 12–16)
IMM GRANULOCYTES NFR BLD AUTO: 0.6 % — HIGH (ref 0.1–0.3)
LYMPHOCYTES # BLD AUTO: 1.42 K/UL — SIGNIFICANT CHANGE UP (ref 1.2–3.4)
LYMPHOCYTES # BLD AUTO: 29.8 % — SIGNIFICANT CHANGE UP (ref 20.5–51.1)
MCHC RBC-ENTMCNC: 29.8 PG — SIGNIFICANT CHANGE UP (ref 27–31)
MCHC RBC-ENTMCNC: 31.6 G/DL — LOW (ref 32–37)
MCV RBC AUTO: 94.5 FL — SIGNIFICANT CHANGE UP (ref 81–99)
MONOCYTES # BLD AUTO: 0.5 K/UL — SIGNIFICANT CHANGE UP (ref 0.1–0.6)
MONOCYTES NFR BLD AUTO: 10.5 % — HIGH (ref 1.7–9.3)
NEUTROPHILS # BLD AUTO: 2.63 K/UL — SIGNIFICANT CHANGE UP (ref 1.4–6.5)
NEUTROPHILS NFR BLD AUTO: 55.3 % — SIGNIFICANT CHANGE UP (ref 42.2–75.2)
NRBC # BLD: 0 /100 WBCS — SIGNIFICANT CHANGE UP (ref 0–0)
PLATELET # BLD AUTO: 181 K/UL — SIGNIFICANT CHANGE UP (ref 130–400)
POTASSIUM SERPL-MCNC: 3.8 MMOL/L — SIGNIFICANT CHANGE UP (ref 3.5–5)
POTASSIUM SERPL-SCNC: 3.8 MMOL/L — SIGNIFICANT CHANGE UP (ref 3.5–5)
RBC # BLD: 4.39 M/UL — SIGNIFICANT CHANGE UP (ref 4.2–5.4)
RBC # FLD: 14.2 % — SIGNIFICANT CHANGE UP (ref 11.5–14.5)
SODIUM SERPL-SCNC: 139 MMOL/L — SIGNIFICANT CHANGE UP (ref 135–146)
TROPONIN T SERPL-MCNC: <0.01 NG/ML — SIGNIFICANT CHANGE UP
WBC # BLD: 4.76 K/UL — LOW (ref 4.8–10.8)
WBC # FLD AUTO: 4.76 K/UL — LOW (ref 4.8–10.8)

## 2019-08-03 PROCEDURE — 99233 SBSQ HOSP IP/OBS HIGH 50: CPT

## 2019-08-03 RX ORDER — IBUPROFEN 200 MG
200 TABLET ORAL ONCE
Refills: 0 | Status: COMPLETED | OUTPATIENT
Start: 2019-08-03 | End: 2019-08-03

## 2019-08-03 RX ADMIN — HEPARIN SODIUM 5000 UNIT(S): 5000 INJECTION INTRAVENOUS; SUBCUTANEOUS at 17:29

## 2019-08-03 RX ADMIN — Medication 25 MILLIGRAM(S): at 21:21

## 2019-08-03 RX ADMIN — CLOPIDOGREL BISULFATE 75 MILLIGRAM(S): 75 TABLET, FILM COATED ORAL at 14:41

## 2019-08-03 RX ADMIN — Medication 50 MILLIGRAM(S): at 05:31

## 2019-08-03 RX ADMIN — SENNA PLUS 2 TABLET(S): 8.6 TABLET ORAL at 21:21

## 2019-08-03 RX ADMIN — AMLODIPINE BESYLATE 5 MILLIGRAM(S): 2.5 TABLET ORAL at 05:30

## 2019-08-03 RX ADMIN — Medication 25 MICROGRAM(S): at 05:30

## 2019-08-03 RX ADMIN — SODIUM CHLORIDE 75 MILLILITER(S): 9 INJECTION, SOLUTION INTRAVENOUS at 21:22

## 2019-08-03 RX ADMIN — PANTOPRAZOLE SODIUM 40 MILLIGRAM(S): 20 TABLET, DELAYED RELEASE ORAL at 05:38

## 2019-08-03 RX ADMIN — Medication 200 MILLIGRAM(S): at 14:41

## 2019-08-03 RX ADMIN — Medication 10 MILLIGRAM(S): at 16:09

## 2019-08-03 RX ADMIN — CEFTRIAXONE 100 MILLIGRAM(S): 500 INJECTION, POWDER, FOR SOLUTION INTRAMUSCULAR; INTRAVENOUS at 05:39

## 2019-08-03 RX ADMIN — LOSARTAN POTASSIUM 50 MILLIGRAM(S): 100 TABLET, FILM COATED ORAL at 05:30

## 2019-08-03 RX ADMIN — LOSARTAN POTASSIUM 50 MILLIGRAM(S): 100 TABLET, FILM COATED ORAL at 17:29

## 2019-08-03 RX ADMIN — Medication 100 MILLIGRAM(S): at 14:41

## 2019-08-03 RX ADMIN — Medication 50 MILLIGRAM(S): at 05:30

## 2019-08-03 RX ADMIN — Medication 100 MILLIGRAM(S): at 21:21

## 2019-08-03 RX ADMIN — POLYETHYLENE GLYCOL 3350 17 GRAM(S): 17 POWDER, FOR SOLUTION ORAL at 17:28

## 2019-08-03 RX ADMIN — Medication 30 MILLILITER(S): at 07:58

## 2019-08-03 RX ADMIN — HEPARIN SODIUM 5000 UNIT(S): 5000 INJECTION INTRAVENOUS; SUBCUTANEOUS at 05:31

## 2019-08-03 RX ADMIN — Medication 50 MILLIGRAM(S): at 17:28

## 2019-08-03 RX ADMIN — Medication 100 MILLIGRAM(S): at 05:31

## 2019-08-03 NOTE — CONSULT NOTE ADULT - SUBJECTIVE AND OBJECTIVE BOX
TUSHAR COLE  79y, Female  Allergy: amiodarone (Unknown)  Levaquin (Short breath; Rash)  morphine (Other)      CHIEF COMPLAINT:     HPI:  · HPI Objective Statement: The pt is a 79y Female with PMH hemorrhagic stroke in March with residual right sided deficits, HTN, hypothyroidism, afib on plavix is presenting to ED with generalized weakness x 1 day. Pt states this am she felt just generalized weakness and overall malaise. pt endorses mild intermittent dizziness and HA that is unchanged from stroke in march. Pt states doctor came yesterday and was concerned for UTI, she took a urine sample and started her on macrobid. pt took 1 dose today. pt denies f/c/n/v/d, abd pain, fall, cp, sob, urinary symptoms, new focal deficits. pt is nonambulatory at baseline.	    PAST MEDICAL/SURGICAL/FAMILY/SOCIAL HISTORY:    Past [facility] (01 Aug 2019 23:12)      INFECTIOUS DISEASE HISTORY:    PAST MEDICAL & SURGICAL HISTORY:  Hemorrhagic stroke  Afib  Hypothyroidism  Gout  HTN (hypertension)  No significant past surgical history      FAMILY HISTORY      SOCIAL HISTORY  Pt denies any current heavy ETOH use, IVDU. No recent travel outside the [place].       ROS  General: Denies rigors, nightsweats  HEENT: Denies headache, rhinorrhea, sore throat, eye pain  CV: Denies CP, palpitations  PULM: Denies SOB, wheezing  GI: Denies abdominal pain, hematochezia/melena  : Denies dysuria, hematuria  MSK: Denies arthralgias, myalgias  SKIN: Denies rash, lesions  NEURO: Denies paresthesias, weakness  PSYCH: Denies depression, anxiety    VITALS:  T(F): 96.5, Max: 96.9 (19 @ 22:18)  HR: 75  BP: 132/68  RR: 16Vital Signs Last 24 Hrs  T(C): 35.8 (03 Aug 2019 14:37), Max: 36.1 (02 Aug 2019 22:18)  T(F): 96.5 (03 Aug 2019 14:37), Max: 96.9 (02 Aug 2019 22:18)  HR: 75 (03 Aug 2019 14:37) (67 - 75)  BP: 132/68 (03 Aug 2019 14:37) (123/68 - 149/68)  BP(mean): --  RR: 16 (03 Aug 2019 14:37) (16 - 16)  SpO2: --    PHYSICAL EXAM:  Gen: NAD, resting in bed  HEENT: Normocephalic, atraumatic  Neck: supple, no lymphadenopathy  CV: Regular rate & regular rhythm  Lungs: decreased BS at bases, no fremitus  Abdomen: Soft, BS present  Ext: Warm, well perfused  Neuro: non focal, awake  Skin: no rash, no erythema  Lines: no phlebitis    TESTS & MEASUREMENTS:                        13.1   4.76  )-----------( 181      ( 03 Aug 2019 09:20 )             41.5         139  |  103  |  13  ----------------------------<  140<H>  3.8   |  20  |  0.8    Ca    8.9      03 Aug 2019 09:20    TPro  6.8  /  Alb  3.5  /  TBili  0.4  /  DBili  x   /  AST  19  /  ALT  11  /  AlkPhos  90      eGFR if Non African American: 70 mL/min/1.73M2 (19 @ 09:20)  eGFR if : 81 mL/min/1.73M2 (19 @ 09:20)    LIVER FUNCTIONS - ( 01 Aug 2019 18:56 )  Alb: 3.5 g/dL / Pro: 6.8 g/dL / ALK PHOS: 90 U/L / ALT: 11 U/L / AST: 19 U/L / GGT: x           Urinalysis Basic - ( 01 Aug 2019 18:56 )    Color: Yellow / Appearance: Cloudy / S.015 / pH: x  Gluc: x / Ketone: Trace  / Bili: Negative / Urobili: 0.2 mg/dL   Blood: x / Protein: 100 mg/dL / Nitrite: Positive   Leuk Esterase: Large / RBC: 1-2 /HPF / WBC 10-25 /HPF   Sq Epi: x / Non Sq Epi: Few /HPF / Bacteria: TNTC /HPF        Culture - Urine (collected 19 @ 18:56)  Source: .Urine Clean Catch (Midstream)  Preliminary Report (19 @ 08:44):    >100,000 CFU/ml Gram Negative Rods        Lactate, Blood: 1.1 mmol/L (19 @ 18:56)      INFECTIOUS DISEASES TESTING      RADIOLOGY & ADDITIONAL TESTS:  I have personally reviewed the last Chest xray  CXR      CT  CT Abdomen and Pelvis No Cont:   EXAM:  CT ABDOMEN AND PELVIS            PROCEDURE DATE:  2019            INTERPRETATION:  CLINICAL STATEMENT: Right lower quadrant pain      TECHNIQUE: Contiguous CT images were obtained of the abdomen and pelvis.  Intravenous Contrast: None.   Oral contrast was not administered.      COMPARISON:  CT abdomen pelvis dated 2017    FINDINGS:    LOWER CHEST: Clear lung bases. Diffuse coronary artery, aortic valvular   and mitral annular calcifications    LIVER: Unremarkable aside from predominantly right hepatic lobe   calcifications. No noncontrast evidence for solid mass.    SPLEEN: Unremarkable.    PANCREAS: Unremarkable.    GALLBLADDER AND BILIARY TREE: Unremarkable CT appearance of the   gallbladder. No biliary ductal dilatation.    ADRENALS: Unremarkable.    KIDNEYS: No hydronephrosis, renal or ureteral calculus. Bilateral renal   and parapelvic cysts    LYMPH NODES: There are no enlarged abdominal or pelvic lymph nodes.    VASCULATURE: The abdominal aorta is normal in caliber and demonstrates   atherosclerotic changes.    BOWEL: No bowel obstruction. Unremarkable appendix. There is moderate   rectal stool with rectal wall thickening.    PERITONEUM/RETROPERITONEUM/MESENTERY: There is no ascites or   pneumoperitoneum. Fat-containing left flank hernia.    PELVIC VISCERA: Hysterectomy. There is air tracking within the urinary   bladder wall consistent with emphysematous cystitis.    BONES AND SOFT TISSUES: Anterior and posterior lumbar spine fusion with   intervertebral disc spacers and lower lumbar spine laminectomies.   Multilevel degenerative changes of the spine.     IMPRESSION:    Air tracking within the urinary bladder wall consistent with   emphysematous cystitis.                        BEBE JUNE M.D., ATTENDING RADIOLOGIST  This document has been electronically signed. Aug  1 2019  9:39PM             (19 @ 20:57)      CARDIOLOGY TESTING  12 Lead ECG:   Ventricular Rate 73 BPM    Atrial Rate 76 BPM    QRS Duration 110 ms    Q-T Interval 416 ms    QTC Calculation(Bezet) 458 ms    R Axis 2 degrees    T Axis 152 degrees    Diagnosis Line Atrial fibrillation  Nonspecific ST-T changes  Non-specific intra-ventricular conduction delay    Reconfirmed by MONICA SHEN MD ([id number]) on 2019 4:13:56 PM (19 @ 09:01)      MEDICATIONS  amLODIPine   Tablet 5  cefTRIAXone   IVPB 1000  clopidogrel Tablet 75  dextrose 5% + sodium chloride 0.45%. 1000  docusate sodium 100  flecainide 50  heparin  Injectable 5000  hydrALAZINE 50  hydrALAZINE 25  levothyroxine 25  losartan 50  pantoprazole    Tablet 40  polyethylene glycol 3350 17  senna 2      ANTIBIOTICS:  cefTRIAXone   IVPB 1000 milliGRAM(s) IV Intermittent every 24 hours      ALLERGIES:  amiodarone (Unknown)  Levaquin (Short breath; Rash)  morphine (Other) TUSHAR COLE  79y, Female  Allergy: amiodarone (Unknown)  Levaquin (Short breath; Rash)  morphine (Other)      CHIEF COMPLAINT:     HPI:  · HPI Objective Statement: The pt is a 79y Female with PMH hemorrhagic stroke in March with residual right sided deficits, HTN, hypothyroidism, afib on plavix is presenting to ED with generalized weakness x 1 day. Pt states this am she felt just generalized weakness and overall malaise. pt endorses mild intermittent dizziness and HA that is unchanged from stroke in march. Pt states doctor came yesterday and was concerned for UTI, she took a urine sample and started her on macrobid. pt took 1 dose today. pt denies f/c/n/v/d, abd pain, fall, cp, sob, urinary symptoms, new focal deficits. pt is nonambulatory at baseline.	    PAST MEDICAL/SURGICAL/FAMILY/SOCIAL HISTORY:    Past [facility] (01 Aug 2019 23:12)      INFECTIOUS DISEASE HISTORY:    PAST MEDICAL & SURGICAL HISTORY:  Hemorrhagic stroke  Afib  Hypothyroidism  Gout  HTN (hypertension)  No significant past surgical history      FAMILY HISTORY      SOCIAL HISTORY  Pt denies any current heavy ETOH use, IVDU. No recent travel outside the [place].       ROS  General: Denies rigors, nightsweats  HEENT: Denies headache, rhinorrhea, sore throat, eye pain  CV: Denies CP, palpitations  PULM: Denies SOB, wheezing  GI: Denies abdominal pain, hematochezia/melena  : Denies dysuria, hematuria  MSK: Denies arthralgias, myalgias  SKIN: Denies rash, lesions  NEURO: Denies paresthesias, weakness  PSYCH: Denies depression, anxiety    VITALS:  T(F): 96.5, Max: 96.9 (19 @ 22:18)  HR: 75  BP: 132/68  RR: 16Vital Signs Last 24 Hrs  T(C): 35.8 (03 Aug 2019 14:37), Max: 36.1 (02 Aug 2019 22:18)  T(F): 96.5 (03 Aug 2019 14:37), Max: 96.9 (02 Aug 2019 22:18)  HR: 75 (03 Aug 2019 14:37) (67 - 75)  BP: 132/68 (03 Aug 2019 14:37) (123/68 - 149/68)  BP(mean): --  RR: 16 (03 Aug 2019 14:37) (16 - 16)  SpO2: --    PHYSICAL EXAM:  Gen: NAD, resting in bed  HEENT: Normocephalic, atraumatic  Neck: supple, no lymphadenopathy  CV: Regular rate & regular rhythm  Lungs: decreased BS at bases, no fremitus  Abdomen: Soft, BS present, +suprapubic ttp  Ext: Warm, well perfused  Neuro: non focal, awake  Skin: no rash, no erythema  Lines: no phlebitis    TESTS & MEASUREMENTS:                        13.1   4.76  )-----------( 181      ( 03 Aug 2019 09:20 )             41.5         139  |  103  |  13  ----------------------------<  140<H>  3.8   |  20  |  0.8    Ca    8.9      03 Aug 2019 09:20    TPro  6.8  /  Alb  3.5  /  TBili  0.4  /  DBili  x   /  AST  19  /  ALT  11  /  AlkPhos  90      eGFR if Non African American: 70 mL/min/1.73M2 (19 @ 09:20)  eGFR if : 81 mL/min/1.73M2 (19 @ 09:20)    LIVER FUNCTIONS - ( 01 Aug 2019 18:56 )  Alb: 3.5 g/dL / Pro: 6.8 g/dL / ALK PHOS: 90 U/L / ALT: 11 U/L / AST: 19 U/L / GGT: x           Urinalysis Basic - ( 01 Aug 2019 18:56 )    Color: Yellow / Appearance: Cloudy / S.015 / pH: x  Gluc: x / Ketone: Trace  / Bili: Negative / Urobili: 0.2 mg/dL   Blood: x / Protein: 100 mg/dL / Nitrite: Positive   Leuk Esterase: Large / RBC: 1-2 /HPF / WBC 10-25 /HPF   Sq Epi: x / Non Sq Epi: Few /HPF / Bacteria: TNTC /HPF        Culture - Urine (collected 19 @ 18:56)  Source: .Urine Clean Catch (Midstream)  Preliminary Report (19 @ 08:44):    >100,000 CFU/ml Gram Negative Rods        Lactate, Blood: 1.1 mmol/L (19 @ 18:56)      INFECTIOUS DISEASES TESTING      RADIOLOGY & ADDITIONAL TESTS:  I have personally reviewed the last Chest xray  CXR      CT  CT Abdomen and Pelvis No Cont:   EXAM:  CT ABDOMEN AND PELVIS            PROCEDURE DATE:  2019            INTERPRETATION:  CLINICAL STATEMENT: Right lower quadrant pain      TECHNIQUE: Contiguous CT images were obtained of the abdomen and pelvis.  Intravenous Contrast: None.   Oral contrast was not administered.      COMPARISON:  CT abdomen pelvis dated 2017    FINDINGS:    LOWER CHEST: Clear lung bases. Diffuse coronary artery, aortic valvular   and mitral annular calcifications    LIVER: Unremarkable aside from predominantly right hepatic lobe   calcifications. No noncontrast evidence for solid mass.    SPLEEN: Unremarkable.    PANCREAS: Unremarkable.    GALLBLADDER AND BILIARY TREE: Unremarkable CT appearance of the   gallbladder. No biliary ductal dilatation.    ADRENALS: Unremarkable.    KIDNEYS: No hydronephrosis, renal or ureteral calculus. Bilateral renal   and parapelvic cysts    LYMPH NODES: There are no enlarged abdominal or pelvic lymph nodes.    VASCULATURE: The abdominal aorta is normal in caliber and demonstrates   atherosclerotic changes.    BOWEL: No bowel obstruction. Unremarkable appendix. There is moderate   rectal stool with rectal wall thickening.    PERITONEUM/RETROPERITONEUM/MESENTERY: There is no ascites or   pneumoperitoneum. Fat-containing left flank hernia.    PELVIC VISCERA: Hysterectomy. There is air tracking within the urinary   bladder wall consistent with emphysematous cystitis.    BONES AND SOFT TISSUES: Anterior and posterior lumbar spine fusion with   intervertebral disc spacers and lower lumbar spine laminectomies.   Multilevel degenerative changes of the spine.     IMPRESSION:    Air tracking within the urinary bladder wall consistent with   emphysematous cystitis.                        BEBE JUNE M.D., ATTENDING RADIOLOGIST  This document has been electronically signed. Aug  1 2019  9:39PM             (19 @ 20:57)      CARDIOLOGY TESTING  12 Lead ECG:   Ventricular Rate 73 BPM    Atrial Rate 76 BPM    QRS Duration 110 ms    Q-T Interval 416 ms    QTC Calculation(Bezet) 458 ms    R Axis 2 degrees    T Axis 152 degrees    Diagnosis Line Atrial fibrillation  Nonspecific ST-T changes  Non-specific intra-ventricular conduction delay    Reconfirmed by MONICA SHEN MD ([id number]) on 2019 4:13:56 PM (19 @ 09:01)      MEDICATIONS  amLODIPine   Tablet 5  cefTRIAXone   IVPB 1000  clopidogrel Tablet 75  dextrose 5% + sodium chloride 0.45%. 1000  docusate sodium 100  flecainide 50  heparin  Injectable 5000  hydrALAZINE 50  hydrALAZINE 25  levothyroxine 25  losartan 50  pantoprazole    Tablet 40  polyethylene glycol 3350 17  senna 2      ANTIBIOTICS:  cefTRIAXone   IVPB 1000 milliGRAM(s) IV Intermittent every 24 hours      ALLERGIES:  amiodarone (Unknown)  Levaquin (Short breath; Rash)  morphine (Other)

## 2019-08-03 NOTE — PROGRESS NOTE ADULT - ASSESSMENT
79 yr old female with rencet UTI, treated as outpatient x 1 day, now with emphysematous cystitis    Case D/W Dr. Dodd:   - continue winchester catheter now and upon discharge, as this is the treatment for EC. No clean intermittent catheterization   - continue IV abx; f/u final urine cx (gram neg rods) for appropriate abx   -  IF patients condition worsens, recommend to change to more broad spectrum abx (meropenem)   - Pt to F/U as outpatient for winchester management 79 yr old female with recent UTI, treated as outpatient x 1 day, now with emphysematous cystitis    Case D/W Dr. Dodd:   - continue winchester catheter now and upon discharge, as this is the treatment for EC. No clean intermittent catheterization   - continue IV abx; f/u final urine cx (gram neg rods) for appropriate abx   -  IF patients condition worsens, recommend to change to more broad spectrum abx (meropenem)   - f/u labs   - Pt to F/U as outpatient for winchester management

## 2019-08-03 NOTE — CONSULT NOTE ADULT - ASSESSMENT
Air tracking within the urinary bladder wall consistent with   emphysematous cystitis.  0n iv abx  will follow                      BEBE JUNE M.D., ATTENDING RADIOLOGIST  This document has been electronically signed. Aug  1 2019  9:39PM

## 2019-08-03 NOTE — PROGRESS NOTE ADULT - SUBJECTIVE AND OBJECTIVE BOX
S: Pt denies any abdominal pain; winchester in place. Afebrile  O; ICU Vital Signs Last 24 Hrs  T(C): 35.6 (03 Aug 2019 05:00), Max: 36.1 (02 Aug 2019 10:39)  T(F): 96 (03 Aug 2019 05:00), Max: 96.9 (02 Aug 2019 10:39)  HR: 71 (03 Aug 2019 07:52) (67 - 74)  BP: 143/67 (03 Aug 2019 07:52) (109/56 - 149/68)  RR: 16 (03 Aug 2019 05:00) (16 - 16)  SpO2: 96% (02 Aug 2019 10:39) (96% - 96%)    EXAM:  abd: soft NT/ND; winchester in place with clear yellow urine    Labs: pending    Urinalysis Basic - ( 01 Aug 2019 18:56 )    Color: Yellow / Appearance: Cloudy / S.015 / pH: x  Gluc: x / Ketone: Trace  / Bili: Negative / Urobili: 0.2 mg/dL   Blood: x / Protein: 100 mg/dL / Nitrite: Positive   Leuk Esterase: Large / RBC: 1-2 /HPF / WBC 10-25 /HPF   Sq Epi: x / Non Sq Epi: Few /HPF / Bacteria: TNTC /HPF      Culture - Urine (collected 01 Aug 2019 18:56)  Source: .Urine Clean Catch (Midstream)  Preliminary Report (03 Aug 2019 08:44):    >100,000 CFU/ml Gram Negative Rods S: Pt denies any abdominal pain; winchester in place. Afebrile  O; ICU Vital Signs Last 24 Hrs  T(C): 35.6 (03 Aug 2019 05:00), Max: 36.1 (02 Aug 2019 10:39)  T(F): 96 (03 Aug 2019 05:00), Max: 96.9 (02 Aug 2019 10:39)  HR: 71 (03 Aug 2019 07:52) (67 - 74)  BP: 143/67 (03 Aug 2019 07:52) (109/56 - 149/68)  RR: 16 (03 Aug 2019 05:00) (16 - 16)  SpO2: 96% (02 Aug 2019 10:39) (96% - 96%)    MEDICATIONS  (STANDING):  amLODIPine   Tablet 5 milliGRAM(s) Oral daily  cefTRIAXone   IVPB 1000 milliGRAM(s) IV Intermittent every 24 hours  clopidogrel Tablet 75 milliGRAM(s) Oral daily  dextrose 5% + sodium chloride 0.45%. 1000 milliLiter(s) (75 mL/Hr) IV Continuous <Continuous>  docusate sodium 100 milliGRAM(s) Oral three times a day  flecainide 50 milliGRAM(s) Oral two times a day  heparin  Injectable 5000 Unit(s) SubCutaneous every 12 hours  hydrALAZINE 50 milliGRAM(s) Oral daily  hydrALAZINE 25 milliGRAM(s) Oral at bedtime  levothyroxine 25 MICROGram(s) Oral daily  losartan 50 milliGRAM(s) Oral two times a day  pantoprazole    Tablet 40 milliGRAM(s) Oral before breakfast  polyethylene glycol 3350 17 Gram(s) Oral two times a day  senna 2 Tablet(s) Oral at bedtime    MEDICATIONS  (PRN):  acetaminophen   Tablet .. 650 milliGRAM(s) Oral every 6 hours PRN Temp greater or equal to 38C (100.4F), Mild Pain (1 - 3)  aluminum hydroxide/magnesium hydroxide/simethicone Suspension 30 milliLiter(s) Oral every 6 hours PRN Dyspepsia  bisacodyl Suppository 10 milliGRAM(s) Rectal daily PRN Constipation      EXAM:  abd: soft NT/ND; winchester in place with clear yellow urine    Labs: pending    Urinalysis Basic - ( 01 Aug 2019 18:56 )    Color: Yellow / Appearance: Cloudy / S.015 / pH: x  Gluc: x / Ketone: Trace  / Bili: Negative / Urobili: 0.2 mg/dL   Blood: x / Protein: 100 mg/dL / Nitrite: Positive   Leuk Esterase: Large / RBC: 1-2 /HPF / WBC 10-25 /HPF   Sq Epi: x / Non Sq Epi: Few /HPF / Bacteria: TNTC /HPF      Culture - Urine (collected 01 Aug 2019 18:56)  Source: .Urine Clean Catch (Midstream)  Preliminary Report (03 Aug 2019 08:44):    >100,000 CFU/ml Gram Negative Rods

## 2019-08-03 NOTE — CONSULT NOTE ADULT - SUBJECTIVE AND OBJECTIVE BOX
: The pt is a 79y Female with PMH hemorrhagic stroke in March with residual right sided deficits, HTN, hypothyroidism, afib on plavix is presenting to ED with generalized weakness x 1 day. Pt states this am she felt just generalized weakness and overall malaise. pt endorses mild intermittent dizziness and HA that is unchanged from stroke in march. Pt states doctor came yesterday and was concerned for UTI, she took a urine sample and started her on macrobid. pt took 1 dose today. pt denies f/c/n/v/d, abd pain, fall, cp, sob, urinary symptoms, new focal deficits. pt is nonambulatory at baseline.	    PAST MEDICAL/SURGICAL/FAMILY/SOCIAL HISTORY:    Past [facility] (01 Aug 2019 23:12)      PAST MEDICAL & SURGICAL HISTORY:  PAST MEDICAL & SURGICAL HISTORY:  Hemorrhagic stroke  Afib  Hypothyroidism  Gout  HTN (hypertension)  No significant past surgical history      VITAL SIGNS:  Vital Signs Last 24 Hrs  T(C): 36.1 (02 Aug 2019 14:25), Max: 36.9 (01 Aug 2019 17:48)  T(F): 96.9 (02 Aug 2019 14:25), Max: 98.4 (01 Aug 2019 17:48)  HR: 72 (02 Aug 2019 14:25) (70 - 88)  BP: 109/56 (02 Aug 2019 14:25) (109/56 - 160/90)  BP(mean): --  RR: 16 (02 Aug 2019 14:25) (16 - 18)  SpO2: 96% (02 Aug 2019 10:39) (96% - 98%)    PHYSICAL EXAMINATION:  General: Awake  , alert  , Not in acute distress  HEENT:  JESU, EOMI  Heart: S1+S2 audible, no murmur  Lungs: bilateral  fair air entry, no wheezing, no crepitations.  Abdomen: Soft, non-tender, non-distended  CNS:  right sided weakness from prior CVA   Extremities:  No edema          REVIEW OF SYSTEMS:    At least 10 systems were reviewed in ROS. All systems reviewed  are within normal limits except for the complaints as described in Subjective.    CONSULTS:  Consultant(s) Notes Reviewed by me.   Care Discussed with Consultants/Other Providers where required.        MEDICATIONS:  MEDICATIONS  (STANDING):  amLODIPine   Tablet 5 milliGRAM(s) Oral daily  cefTRIAXone   IVPB 1000 milliGRAM(s) IV Intermittent every 24 hours  clopidogrel Tablet 75 milliGRAM(s) Oral daily  dextrose 5% + sodium chloride 0.45%. 1000 milliLiter(s) (75 mL/Hr) IV Continuous <Continuous>  docusate sodium 100 milliGRAM(s) Oral three times a day  flecainide 50 milliGRAM(s) Oral two times a day  heparin  Injectable 5000 Unit(s) SubCutaneous every 12 hours  hydrALAZINE 50 milliGRAM(s) Oral daily  hydrALAZINE 25 milliGRAM(s) Oral at bedtime  levothyroxine 25 MICROGram(s) Oral daily  losartan 50 milliGRAM(s) Oral two times a day  pantoprazole    Tablet 40 milliGRAM(s) Oral before breakfast  polyethylene glycol 3350 17 Gram(s) Oral two times a day  senna 2 Tablet(s) Oral at bedtime    MEDICATIONS  (PRN):  acetaminophen   Tablet .. 650 milliGRAM(s) Oral every 6 hours PRN Temp greater or equal to 38C (100.4F), Mild Pain (1 - 3)  aluminum hydroxide/magnesium hydroxide/simethicone Suspension 30 milliLiter(s) Oral every 6 hours PRN Dyspepsia  bisacodyl Suppository 10 milliGRAM(s) Rectal daily PRN Constipation      [facility]/I & O's:                        13.0   6.43  )-----------( 184      ( 01 Aug 2019 18:56 )             40.8     08-    139  |  101  |  15  ----------------------------<  99  4.1   |  25  |  1.0    Ca    9.3      01 Aug 2019 18:56    TPro  6.8  /  Alb  3.5  /  TBili  0.4  /  DBili  x   /  AST  19  /  ALT  11  /  AlkPhos  90      PT/INR - ( 01 Aug 2019 18:56 )   PT: 11.80 sec;   INR: 1.03 ratio         PTT - ( 01 Aug 2019 18:56 )  PTT:25.4 sec  Urinalysis Basic - ( 01 Aug 2019 18:56 )    Color: Yellow / Appearance: Cloudy / S.015 / pH: x  Gluc: x / Ketone: Trace  / Bili: Negative / Urobili: 0.2 mg/dL   Blood: x / Protein: 100 mg/dL / Nitrite: Positive   Leuk Esterase: Large / RBC: 1-2 /HPF / WBC 10-25 /HPF   Sq Epi: x / Non Sq Epi: Few /HPF / Bacteria: TNTC /HPF      CAPILLARY BLOOD GLUCOSE            Urinalysis Basic - ( 01 Aug 2019 18:56 )    Color: Yellow / Appearance: Cloudy / S.015 / pH: x  Gluc: x / Ketone: Trace  / Bili: Negative / Urobili: 0.2 mg/dL   Blood: x / Protein: 100 mg/dL / Nitrite: Positive   Leuk Esterase: Large / RBC: 1-2 /HPF / WBC 10-25 /HPF   Sq Epi: x / Non Sq Epi: Few /HPF / Bacteria: TNTC /HPF    EXAM:  CT ABDOMEN AND PELVIS            PROCEDURE DATE:  2019            INTERPRETATION:  CLINICAL STATEMENT: Right lower quadrant pain      TECHNIQUE: Contiguous CT images were obtained of the abdomen and pelvis.  Intravenous Contrast: None.   Oral contrast was not administered.      COMPARISON:  CT abdomen pelvis dated 2017    FINDINGS:    LOWER CHEST: Clear lung bases. Diffuse coronary artery, aortic valvular   and mitral annular calcifications    LIVER: Unremarkable aside from predominantly right hepatic lobe   calcifications. No noncontrast evidence for solid mass.    SPLEEN: Unremarkable.    PANCREAS: Unremarkable.    GALLBLADDER AND BILIARY TREE: Unremarkable CT appearance of the   gallbladder. No biliary ductal dilatation.    ADRENALS: Unremarkable.    KIDNEYS: No hydronephrosis, renal or ureteral calculus. Bilateral renal   and parapelvic cysts    LYMPH NODES: There are no enlarged abdominal or pelvic lymph nodes.    VASCULATURE: The abdominal aorta is normal in caliber and demonstrates   atherosclerotic changes.    BOWEL: No bowel obstruction. Unremarkable appendix. There is moderate   rectal stool with rectal wall thickening.    PERITONEUM/RETROPERITONEUM/MESENTERY: There is no ascites or   pneumoperitoneum. Fat-containing left flank hernia.    PELVIC VISCERA: Hysterectomy. There is air tracking within the urinary   bladder wall consistent with emphysematous cystitis.    BONES AND SOFT TISSUES: Anterior and posterior lumbar spine fusion with   intervertebral disc spacers and lower lumbar spine laminectomies.   Multilevel degenerative changes of the spine.     IMPRESSION:    Air tracking within the urinary bladder wall consistent with   emphysematous cystitis.                        BEBE JUNE M.D., ATTENDING RADIOLOGIST  This document has been electronically signed. Aug  1 2019  9:39PM                  ASSESSMENT:     The pt is a 79y Female with PMH hemorrhagic stroke in March with residual right sided deficits, HTN, hypothyroidism, afib on xarelto  is presenting to ED with generalized weakness with dizziness for one day.     Emphysematous cystitis/UTI

## 2019-08-03 NOTE — CONSULT NOTE ADULT - ATTENDING COMMENTS
Pt seen and examined. CT images showing emphysematous cystitis. Recommend winchester catheter for decompression. IV abx.  If pt continues to be stable can dc winchester on day of discharge and obtain bladder scan to ensure pt is emptying.

## 2019-08-03 NOTE — CONSULT NOTE ADULT - ASSESSMENT
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    CTAP Air tracking within the urinary bladder wall consistent with emphysematous cystitis.    Sepsis ruled out on admission    UCX GNR  #Obesity BMI (kg/m2): 32.3    RECOMMENDATIONS  - urine culture sensitivities  - ceftriaxone 1g q24h iv, if hemodynamic compromise, or t>101 change to meropenem

## 2019-08-03 NOTE — PROGRESS NOTE ADULT - SUBJECTIVE AND OBJECTIVE BOX
Progress Note:  Provider Speciality                            Hospitalist      TUSHAR COLE [id number] 79y Female     CHIEF PRESENTING COMPLAINT:  Patient is a 79y old  Female who presents with a chief complaint of gen.  weakness.       SUBJECTIVE:  Patient was seen and examined at bedside.  c/w dyspepsia symptoms/ episodic chest pressure - has h/o of it/ inability to urinate in the morning but later voided independently/ constipation    No significant overnight events reported.     HISTORY OF PRESENTING ILLNESS:  HPI:  · HPI Objective Statement: The pt is a 79y Female with PMH hemorrhagic stroke in March with residual right sided deficits, HTN, hypothyroidism, afib on plavix is presenting to ED with generalized weakness x 1 day. Pt states this am she felt just generalized weakness and overall malaise. pt endorses mild intermittent dizziness and HA that is unchanged from stroke in march. Pt states doctor came yesterday and was concerned for UTI, she took a urine sample and started her on macrobid. pt took 1 dose today. pt denies f/c/n/v/d, abd pain, fall, cp, sob, urinary symptoms, new focal deficits. pt is nonambulatory at baseline.	    PAST MEDICAL/SURGICAL/FAMILY/SOCIAL HISTORY:    Past Medica (01 Aug 2019 23:12)      PAST MEDICAL & SURGICAL HISTORY:  PAST MEDICAL & SURGICAL HISTORY:  Hemorrhagic stroke  Afib  Hypothyroidism  Gout  HTN (hypertension)  No significant past surgical history      VITAL SIGNS:  Vital Signs Last 24 Hrs  T(C): 36.1 (02 Aug 2019 14:25), Max: 36.9 (01 Aug 2019 17:48)  T(F): 96.9 (02 Aug 2019 14:25), Max: 98.4 (01 Aug 2019 17:48)  HR: 72 (02 Aug 2019 14:25) (70 - 88)  BP: 109/56 (02 Aug 2019 14:25) (109/56 - 160/90)  BP(mean): --  RR: 16 (02 Aug 2019 14:25) (16 - 18)  SpO2: 96% (02 Aug 2019 10:39) (96% - 98%)    PHYSICAL EXAMINATION:  General: Awake  , alert  , Not in acute distress  HEENT:  JESU, EOMI  Heart: S1+S2 audible, no murmur  Lungs: bilateral  fair air entry, no wheezing, no crepitations.  Abdomen: Soft, non-tender, non-distended  CNS:  right sided weakness from prior CVA   Extremities:  No edema          REVIEW OF SYSTEMS:    At least 10 systems were reviewed in ROS. All systems reviewed  are within normal limits except for the complaints as described in Subjective.    CONSULTS:  Consultant(s) Notes Reviewed by me.   Care Discussed with Consultants/Other Providers where required.        MEDICATIONS:  MEDICATIONS  (STANDING):  amLODIPine   Tablet 5 milliGRAM(s) Oral daily  cefTRIAXone   IVPB 1000 milliGRAM(s) IV Intermittent every 24 hours  clopidogrel Tablet 75 milliGRAM(s) Oral daily  dextrose 5% + sodium chloride 0.45%. 1000 milliLiter(s) (75 mL/Hr) IV Continuous <Continuous>  docusate sodium 100 milliGRAM(s) Oral three times a day  flecainide 50 milliGRAM(s) Oral two times a day  heparin  Injectable 5000 Unit(s) SubCutaneous every 12 hours  hydrALAZINE 50 milliGRAM(s) Oral daily  hydrALAZINE 25 milliGRAM(s) Oral at bedtime  levothyroxine 25 MICROGram(s) Oral daily  losartan 50 milliGRAM(s) Oral two times a day  pantoprazole    Tablet 40 milliGRAM(s) Oral before breakfast  polyethylene glycol 3350 17 Gram(s) Oral two times a day  senna 2 Tablet(s) Oral at bedtime    MEDICATIONS  (PRN):  acetaminophen   Tablet .. 650 milliGRAM(s) Oral every 6 hours PRN Temp greater or equal to 38C (100.4F), Mild Pain (1 - 3)  aluminum hydroxide/magnesium hydroxide/simethicone Suspension 30 milliLiter(s) Oral every 6 hours PRN Dyspepsia  bisacodyl Suppository 10 milliGRAM(s) Rectal daily PRN Constipation      LABOROTORY DATA/MICROBIOLOGY/I & O's:                        13.0   6.43  )-----------( 184      ( 01 Aug 2019 18:56 )             40.8     08-    139  |  101  |  15  ----------------------------<  99  4.1   |  25  |  1.0    Ca    9.3      01 Aug 2019 18:56    TPro  6.8  /  Alb  3.5  /  TBili  0.4  /  DBili  x   /  AST  19  /  ALT  11  /  AlkPhos  90      PT/INR - ( 01 Aug 2019 18:56 )   PT: 11.80 sec;   INR: 1.03 ratio         PTT - ( 01 Aug 2019 18:56 )  PTT:25.4 sec  Urinalysis Basic - ( 01 Aug 2019 18:56 )    Color: Yellow / Appearance: Cloudy / S.015 / pH: x  Gluc: x / Ketone: Trace  / Bili: Negative / Urobili: 0.2 mg/dL   Blood: x / Protein: 100 mg/dL / Nitrite: Positive   Leuk Esterase: Large / RBC: 1-2 /HPF / WBC 10-25 /HPF   Sq Epi: x / Non Sq Epi: Few /HPF / Bacteria: TNTC /HPF      CAPILLARY BLOOD GLUCOSE            Urinalysis Basic - ( 01 Aug 2019 18:56 )    Color: Yellow / Appearance: Cloudy / S.015 / pH: x  Gluc: x / Ketone: Trace  / Bili: Negative / Urobili: 0.2 mg/dL   Blood: x / Protein: 100 mg/dL / Nitrite: Positive   Leuk Esterase: Large / RBC: 1-2 /HPF / WBC 10-25 /HPF   Sq Epi: x / Non Sq Epi: Few /HPF / Bacteria: TNTC /HPF                    ASSESSMENT:     The pt is a 79y Female with PMH hemorrhagic stroke in March with residual right sided deficits, HTN, hypothyroidism, afib on xarelto  is presenting to ED with generalized weakness with dizziness for one day.     Emphysematous cystitis/UTI   A-fib on xarelto   atypical/ reproducible left sided chest pain with dyspepsia like symptoms  h/o hemorrhagic CVA with residual right sided weakness  HTN   Hypothyroidism   constipation     Plan:    c/w IV ABX , rocephin   UCX growing GNR's/ follow up sensitivities   urology consult appreciated - c/w winchester cath and discharge with it and fu as OP with urology   Cardio eval appreciated - EKG noted - no acute changes/trop negative/repeat trop at 3pm    maalox prn for dyspepsia  continue to hold xarelto as per Cardio with hemorrhagic CVA h/o   c/w hydralazine losartan / norvasc for HTN   c/w flecainide for HR control   bowel regimen/c/w miralax bid if no BM / bisacodyl suppository   c/w synthroid     #Progress Note Handoff  Pending :  clinical improvement/ UCX sensitivities   Disposition:  home when stable   Discussion: Discussed with patient -satisfied

## 2019-08-04 ENCOUNTER — TRANSCRIPTION ENCOUNTER (OUTPATIENT)
Age: 79
End: 2019-08-04

## 2019-08-04 LAB
-  AMIKACIN: SIGNIFICANT CHANGE UP
-  AMIKACIN: SIGNIFICANT CHANGE UP
-  AMPICILLIN/SULBACTAM: SIGNIFICANT CHANGE UP
-  AMPICILLIN/SULBACTAM: SIGNIFICANT CHANGE UP
-  AMPICILLIN: SIGNIFICANT CHANGE UP
-  AMPICILLIN: SIGNIFICANT CHANGE UP
-  AZTREONAM: SIGNIFICANT CHANGE UP
-  AZTREONAM: SIGNIFICANT CHANGE UP
-  CEFAZOLIN: SIGNIFICANT CHANGE UP
-  CEFAZOLIN: SIGNIFICANT CHANGE UP
-  CEFEPIME: SIGNIFICANT CHANGE UP
-  CEFEPIME: SIGNIFICANT CHANGE UP
-  CEFOXITIN: SIGNIFICANT CHANGE UP
-  CEFOXITIN: SIGNIFICANT CHANGE UP
-  CEFTRIAXONE: SIGNIFICANT CHANGE UP
-  CEFTRIAXONE: SIGNIFICANT CHANGE UP
-  CIPROFLOXACIN: SIGNIFICANT CHANGE UP
-  CIPROFLOXACIN: SIGNIFICANT CHANGE UP
-  ERTAPENEM: SIGNIFICANT CHANGE UP
-  ERTAPENEM: SIGNIFICANT CHANGE UP
-  GENTAMICIN: SIGNIFICANT CHANGE UP
-  GENTAMICIN: SIGNIFICANT CHANGE UP
-  IMIPENEM: SIGNIFICANT CHANGE UP
-  LEVOFLOXACIN: SIGNIFICANT CHANGE UP
-  LEVOFLOXACIN: SIGNIFICANT CHANGE UP
-  MEROPENEM: SIGNIFICANT CHANGE UP
-  MEROPENEM: SIGNIFICANT CHANGE UP
-  NITROFURANTOIN: SIGNIFICANT CHANGE UP
-  NITROFURANTOIN: SIGNIFICANT CHANGE UP
-  PIPERACILLIN/TAZOBACTAM: SIGNIFICANT CHANGE UP
-  PIPERACILLIN/TAZOBACTAM: SIGNIFICANT CHANGE UP
-  TIGECYCLINE: SIGNIFICANT CHANGE UP
-  TOBRAMYCIN: SIGNIFICANT CHANGE UP
-  TOBRAMYCIN: SIGNIFICANT CHANGE UP
-  TRIMETHOPRIM/SULFAMETHOXAZOLE: SIGNIFICANT CHANGE UP
-  TRIMETHOPRIM/SULFAMETHOXAZOLE: SIGNIFICANT CHANGE UP
CULTURE RESULTS: SIGNIFICANT CHANGE UP
METHOD TYPE: SIGNIFICANT CHANGE UP
METHOD TYPE: SIGNIFICANT CHANGE UP
ORGANISM # SPEC MICROSCOPIC CNT: SIGNIFICANT CHANGE UP
SPECIMEN SOURCE: SIGNIFICANT CHANGE UP

## 2019-08-04 PROCEDURE — 99233 SBSQ HOSP IP/OBS HIGH 50: CPT

## 2019-08-04 PROCEDURE — 99222 1ST HOSP IP/OBS MODERATE 55: CPT

## 2019-08-04 RX ORDER — CEFPODOXIME PROXETIL 100 MG
1 TABLET ORAL
Qty: 12 | Refills: 0
Start: 2019-08-04 | End: 2019-08-09

## 2019-08-04 RX ORDER — HYDRALAZINE HCL 50 MG
1 TABLET ORAL
Qty: 0 | Refills: 0 | DISCHARGE
Start: 2019-08-04

## 2019-08-04 RX ORDER — DOCUSATE SODIUM 100 MG
1 CAPSULE ORAL
Qty: 0 | Refills: 0 | DISCHARGE
Start: 2019-08-04

## 2019-08-04 RX ORDER — CLOPIDOGREL BISULFATE 75 MG/1
1 TABLET, FILM COATED ORAL
Qty: 0 | Refills: 0 | DISCHARGE
Start: 2019-08-04

## 2019-08-04 RX ORDER — FLECAINIDE ACETATE 50 MG
1 TABLET ORAL
Qty: 0 | Refills: 0 | DISCHARGE
Start: 2019-08-04

## 2019-08-04 RX ORDER — LEVOTHYROXINE SODIUM 125 MCG
1 TABLET ORAL
Qty: 0 | Refills: 0 | DISCHARGE
Start: 2019-08-04

## 2019-08-04 RX ORDER — POLYETHYLENE GLYCOL 3350 17 G/17G
17 POWDER, FOR SOLUTION ORAL
Qty: 1 | Refills: 0
Start: 2019-08-04 | End: 2019-09-02

## 2019-08-04 RX ORDER — SENNA PLUS 8.6 MG/1
2 TABLET ORAL
Qty: 0 | Refills: 0 | DISCHARGE
Start: 2019-08-04

## 2019-08-04 RX ORDER — ACETAMINOPHEN 500 MG
2 TABLET ORAL
Qty: 56 | Refills: 0
Start: 2019-08-04 | End: 2019-08-10

## 2019-08-04 RX ORDER — LOSARTAN POTASSIUM 100 MG/1
1 TABLET, FILM COATED ORAL
Qty: 0 | Refills: 0 | DISCHARGE
Start: 2019-08-04

## 2019-08-04 RX ORDER — AMLODIPINE BESYLATE 2.5 MG/1
1 TABLET ORAL
Qty: 0 | Refills: 0 | DISCHARGE
Start: 2019-08-04

## 2019-08-04 RX ORDER — CEFPODOXIME PROXETIL 100 MG
200 TABLET ORAL EVERY 12 HOURS
Refills: 0 | Status: DISCONTINUED | OUTPATIENT
Start: 2019-08-04 | End: 2019-08-05

## 2019-08-04 RX ADMIN — SENNA PLUS 2 TABLET(S): 8.6 TABLET ORAL at 21:23

## 2019-08-04 RX ADMIN — LOSARTAN POTASSIUM 50 MILLIGRAM(S): 100 TABLET, FILM COATED ORAL at 17:20

## 2019-08-04 RX ADMIN — CEFTRIAXONE 100 MILLIGRAM(S): 500 INJECTION, POWDER, FOR SOLUTION INTRAMUSCULAR; INTRAVENOUS at 05:30

## 2019-08-04 RX ADMIN — SODIUM CHLORIDE 75 MILLILITER(S): 9 INJECTION, SOLUTION INTRAVENOUS at 05:27

## 2019-08-04 RX ADMIN — HEPARIN SODIUM 5000 UNIT(S): 5000 INJECTION INTRAVENOUS; SUBCUTANEOUS at 17:20

## 2019-08-04 RX ADMIN — Medication 25 MICROGRAM(S): at 05:25

## 2019-08-04 RX ADMIN — CLOPIDOGREL BISULFATE 75 MILLIGRAM(S): 75 TABLET, FILM COATED ORAL at 11:17

## 2019-08-04 RX ADMIN — AMLODIPINE BESYLATE 5 MILLIGRAM(S): 2.5 TABLET ORAL at 05:25

## 2019-08-04 RX ADMIN — LOSARTAN POTASSIUM 50 MILLIGRAM(S): 100 TABLET, FILM COATED ORAL at 05:25

## 2019-08-04 RX ADMIN — Medication 50 MILLIGRAM(S): at 17:21

## 2019-08-04 RX ADMIN — Medication 100 MILLIGRAM(S): at 05:25

## 2019-08-04 RX ADMIN — Medication 200 MILLIGRAM(S): at 17:20

## 2019-08-04 RX ADMIN — POLYETHYLENE GLYCOL 3350 17 GRAM(S): 17 POWDER, FOR SOLUTION ORAL at 17:20

## 2019-08-04 RX ADMIN — HEPARIN SODIUM 5000 UNIT(S): 5000 INJECTION INTRAVENOUS; SUBCUTANEOUS at 05:25

## 2019-08-04 RX ADMIN — Medication 650 MILLIGRAM(S): at 11:55

## 2019-08-04 RX ADMIN — Medication 100 MILLIGRAM(S): at 13:17

## 2019-08-04 RX ADMIN — Medication 650 MILLIGRAM(S): at 11:17

## 2019-08-04 RX ADMIN — Medication 50 MILLIGRAM(S): at 05:25

## 2019-08-04 RX ADMIN — Medication 25 MILLIGRAM(S): at 21:23

## 2019-08-04 RX ADMIN — POLYETHYLENE GLYCOL 3350 17 GRAM(S): 17 POWDER, FOR SOLUTION ORAL at 05:25

## 2019-08-04 RX ADMIN — Medication 100 MILLIGRAM(S): at 21:23

## 2019-08-04 RX ADMIN — PANTOPRAZOLE SODIUM 40 MILLIGRAM(S): 20 TABLET, DELAYED RELEASE ORAL at 05:25

## 2019-08-04 NOTE — CHART NOTE - NSCHARTNOTEFT_GEN_A_CORE
As per  attending; If pt continues to be stable can dc winchester on day of discharge and obtain bladder scan to ensure pt is emptying.

## 2019-08-04 NOTE — DISCHARGE NOTE PROVIDER - NSDCCPCAREPLAN_GEN_ALL_CORE_FT
PRINCIPAL DISCHARGE DIAGNOSIS  Diagnosis: Emphysematous cystitis  Assessment and Plan of Treatment: clinically improved/ complete po ABX for 6 more days. Continue with winchester catheter until follow up with urology. PRINCIPAL DISCHARGE DIAGNOSIS  Diagnosis: Emphysematous cystitis  Assessment and Plan of Treatment: clinically improved/ complete po ABX for 6 more days. Quintanilla cath discontinued and patient is voiding freely and to follow up urologyas OP  for follow up.

## 2019-08-04 NOTE — DISCHARGE NOTE PROVIDER - HOSPITAL COURSE
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         patient was admitted for Emphysematous cystitis/UTI and treated with IV ABX, and switched to po aBX with clinical improvement in consultation with ID recommendations. UCX growing growing E-coli and proteus mirabilis- sensitivities noted and patient to complete 6 more days of oral ABX.         Patient also had inability to void, urology consulted and suggested to continue winchester catheter and to discharge patient with winchester catheter until fu urology as OP.         Patient also c/o atypical /reproducible chest pain with dyspepsia symptoms , 2 trops negative, and chest pain improved.  cardiology evaluated patient , suggest to keep holding xarelto for previous h/o hemorrhagic stroke.         Patient improved clinically and stable for discharge home. 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         patient was admitted for Emphysematous cystitis/UTI and treated with IV ABX, and switched to po aBX with clinical improvement in consultation with ID recommendations. UCX growing growing E-coli and proteus mirabilis- sensitivities noted and patient to complete 6 more days of oral ABX.         Patient also had inability to void, urology consulted and suggested winchester catheter , which has been discontinued and patient is voiding freely and to follow up with urology as OP.         Patient also c/o atypical /reproducible chest pain with dyspepsia symptoms , 2 trops negative, and chest pain improved.  cardiology evaluated patient , suggest to keep holding xarelto for previous h/o hemorrhagic stroke.         Patient improved clinically and stable for discharge home.

## 2019-08-04 NOTE — DISCHARGE NOTE PROVIDER - NSDCFUSCHEDAPPT_GEN_ALL_CORE_FT
TUSHAR COLE ; 08/06/2019 ; [facility] [address] TUSHAR COLE ; 08/06/2019 ; [facility] [address] TUSHAR COLE ; 08/06/2019 ; [facility] [address]

## 2019-08-04 NOTE — DISCHARGE NOTE PROVIDER - CARE PROVIDER_API CALL
Omar Dodd)  [facility]  [address] [place] [address], [address]  [place] [address]  Phone: [phone]  Fax: [phone]  Follow Up Time: 1 week

## 2019-08-05 ENCOUNTER — TRANSCRIPTION ENCOUNTER (OUTPATIENT)
Age: 79
End: 2019-08-05

## 2019-08-05 VITALS
RESPIRATION RATE: 16 BRPM | DIASTOLIC BLOOD PRESSURE: 67 MMHG | SYSTOLIC BLOOD PRESSURE: 117 MMHG | HEART RATE: 94 BPM | TEMPERATURE: 97 F

## 2019-08-05 PROCEDURE — 99239 HOSP IP/OBS DSCHRG MGMT >30: CPT

## 2019-08-05 RX ORDER — OMEPRAZOLE 10 MG/1
1 CAPSULE, DELAYED RELEASE ORAL
Qty: 30 | Refills: 0
Start: 2019-08-05 | End: 2019-09-03

## 2019-08-05 RX ADMIN — PANTOPRAZOLE SODIUM 40 MILLIGRAM(S): 20 TABLET, DELAYED RELEASE ORAL at 06:24

## 2019-08-05 RX ADMIN — Medication 100 MILLIGRAM(S): at 06:22

## 2019-08-05 RX ADMIN — Medication 50 MILLIGRAM(S): at 06:23

## 2019-08-05 RX ADMIN — AMLODIPINE BESYLATE 5 MILLIGRAM(S): 2.5 TABLET ORAL at 06:22

## 2019-08-05 RX ADMIN — LOSARTAN POTASSIUM 50 MILLIGRAM(S): 100 TABLET, FILM COATED ORAL at 06:22

## 2019-08-05 RX ADMIN — Medication 50 MILLIGRAM(S): at 06:22

## 2019-08-05 RX ADMIN — HEPARIN SODIUM 5000 UNIT(S): 5000 INJECTION INTRAVENOUS; SUBCUTANEOUS at 06:23

## 2019-08-05 RX ADMIN — CLOPIDOGREL BISULFATE 75 MILLIGRAM(S): 75 TABLET, FILM COATED ORAL at 11:25

## 2019-08-05 RX ADMIN — Medication 100 MILLIGRAM(S): at 14:03

## 2019-08-05 RX ADMIN — Medication 200 MILLIGRAM(S): at 06:23

## 2019-08-05 RX ADMIN — Medication 25 MICROGRAM(S): at 06:22

## 2019-08-05 NOTE — PROGRESS NOTE ADULT - SUBJECTIVE AND OBJECTIVE BOX
Progress Note:  Provider Speciality                            Hospitalist      TUSHAR COLE [id number] 79y Female     CHIEF PRESENTING COMPLAINT:  Patient is a 79y old  Female who presents with a chief complaint of gen.  weakness.       SUBJECTIVE:  Patient was seen and examined at bedside.  reports doing better/ no new complaints/ agreeable with dc planning after winchester cath removal   No significant overnight events reported.     HISTORY OF PRESENTING ILLNESS:  HPI:  · HPI Objective Statement: The pt is a 79y Female with PMH hemorrhagic stroke in March with residual right sided deficits, HTN, hypothyroidism, afib on plavix is presenting to ED with generalized weakness x 1 day. Pt states this am she felt just generalized weakness and overall malaise. pt endorses mild intermittent dizziness and HA that is unchanged from stroke in march. Pt states doctor came yesterday and was concerned for UTI, she took a urine sample and started her on macrobid. pt took 1 dose today. pt denies f/c/n/v/d, abd pain, fall, cp, sob, urinary symptoms, new focal deficits. pt is nonambulatory at baseline.	    PAST MEDICAL/SURGICAL/FAMILY/SOCIAL HISTORY:    Past Medica (01 Aug 2019 23:12)      PAST MEDICAL & SURGICAL HISTORY:  PAST MEDICAL & SURGICAL HISTORY:  Hemorrhagic stroke  Afib  Hypothyroidism  Gout  HTN (hypertension)  No significant past surgical history    Vital Signs Last 24 Hrs  T(C): 36.1 (05 Aug 2019 05:37), Max: 36.1 (05 Aug 2019 05:37)  T(F): 96.9 (05 Aug 2019 05:37), Max: 96.9 (05 Aug 2019 05:37)  HR: 65 (05 Aug 2019 05:37) (65 - 85)  BP: 125/72 (05 Aug 2019 05:37) (125/72 - 145/67)  BP(mean): --  RR: 16 (05 Aug 2019 05:37) (16 - 16)  SpO2: --      PHYSICAL EXAMINATION:  General: Awake  , alert  , Not in acute distress  HEENT:  JESU, EOMI  Heart: S1+S2 audible, no murmur  Lungs: bilateral  fair air entry, no wheezing, no crepitations.  Abdomen: Soft, non-tender, non-distended  CNS:  right sided weakness from prior CVA   : winchester cath with clear urine   Extremities:  No edema          REVIEW OF SYSTEMS:    At least 10 systems were reviewed in ROS. All systems reviewed  are within normal limits except for the complaints as described in Subjective.    CONSULTS:  Consultant(s) Notes Reviewed by me.   Care Discussed with Consultants/Other Providers where required.        MEDICATIONS:  MEDICATIONS  (STANDING):  amLODIPine   Tablet 5 milliGRAM(s) Oral daily  cefTRIAXone   IVPB 1000 milliGRAM(s) IV Intermittent every 24 hours  clopidogrel Tablet 75 milliGRAM(s) Oral daily  dextrose 5% + sodium chloride 0.45%. 1000 milliLiter(s) (75 mL/Hr) IV Continuous <Continuous>  docusate sodium 100 milliGRAM(s) Oral three times a day  flecainide 50 milliGRAM(s) Oral two times a day  heparin  Injectable 5000 Unit(s) SubCutaneous every 12 hours  hydrALAZINE 50 milliGRAM(s) Oral daily  hydrALAZINE 25 milliGRAM(s) Oral at bedtime  levothyroxine 25 MICROGram(s) Oral daily  losartan 50 milliGRAM(s) Oral two times a day  pantoprazole    Tablet 40 milliGRAM(s) Oral before breakfast  polyethylene glycol 3350 17 Gram(s) Oral two times a day  senna 2 Tablet(s) Oral at bedtime    MEDICATIONS  (PRN):  acetaminophen   Tablet .. 650 milliGRAM(s) Oral every 6 hours PRN Temp greater or equal to 38C (100.4F), Mild Pain (1 - 3)  aluminum hydroxide/magnesium hydroxide/simethicone Suspension 30 milliLiter(s) Oral every 6 hours PRN Dyspepsia  bisacodyl Suppository 10 milliGRAM(s) Rectal daily PRN Constipation      LABOROTORY DATA/MICROBIOLOGY/I & O's:                        13.0   6.43  )-----------( 184      ( 01 Aug 2019 18:56 )             40.8         139  |  101  |  15  ----------------------------<  99  4.1   |  25  |  1.0    Ca    9.3      01 Aug 2019 18:56    TPro  6.8  /  Alb  3.5  /  TBili  0.4  /  DBili  x   /  AST  19  /  ALT  11  /  AlkPhos  90      PT/INR - ( 01 Aug 2019 18:56 )   PT: 11.80 sec;   INR: 1.03 ratio         PTT - ( 01 Aug 2019 18:56 )  PTT:25.4 sec  Urinalysis Basic - ( 01 Aug 2019 18:56 )    Color: Yellow / Appearance: Cloudy / S.015 / pH: x  Gluc: x / Ketone: Trace  / Bili: Negative / Urobili: 0.2 mg/dL   Blood: x / Protein: 100 mg/dL / Nitrite: Positive   Leuk Esterase: Large / RBC: 1-2 /HPF / WBC 10-25 /HPF   Sq Epi: x / Non Sq Epi: Few /HPF / Bacteria: TNTC /HPF      CAPILLARY BLOOD GLUCOSE            Urinalysis Basic - ( 01 Aug 2019 18:56 )    Color: Yellow / Appearance: Cloudy / S.015 / pH: x  Gluc: x / Ketone: Trace  / Bili: Negative / Urobili: 0.2 mg/dL   Blood: x / Protein: 100 mg/dL / Nitrite: Positive   Leuk Esterase: Large / RBC: 1-2 /HPF / WBC 10-25 /HPF   Sq Epi: x / Non Sq Epi: Few /HPF / Bacteria: TNTC /HPF                    ASSESSMENT:     The pt is a 79y Female with PMH hemorrhagic stroke in March with residual right sided deficits, HTN, hypothyroidism, afib on xarelto  is presenting to ED with generalized weakness with dizziness for one day.     Emphysematous cystitis/UTI   A-fib/ no longer on xarelto due to recent hemorrhagic cva   atypical/ reproducible left sided chest pain with dyspepsia like symptoms-improved   h/o hemorrhagic CVA with residual right sided weakness  HTN   Hypothyroidism   constipation     Plan:    afebrile  UCX sensitivities noted: growing E-coli and proteus mirabilis- sensitivities noted    c/w po vantin for 5 more days for UTI  urology follow up appreciated - dc winchester cath before discharge and TOV- patient urinating without difficulty   Cardio eval appreciated - EKG noted - no acute changes/trop negative x 2.   maalox prn for dyspepsia  continue to hold xarelto as per Cardio with recent hemorrhagic CVA h/o- discussed with daughter/ patient placed on plavix by her cardiologist about 2 months after CVA    c/w hydralazine/ losartan / norvasc for HTN   c/w flecainide for HR control   bowel regimen/c/w miralax bid /suppository prn   c/w synthroid     PT eval/ rehab consult     as per CM, patient will have VN go home and it will take time for HHA to be initiated.      #Progress Note Handoff  Pending :  home if home care services set up   Disposition:  today or tomorrow  Discussion: Discussed with patient and daughter - satisfied

## 2019-08-05 NOTE — DISCHARGE NOTE NURSING/CASE MANAGEMENT/SOCIAL WORK - NSDCDPATPORTLINK_GEN_ALL_CORE
You can access the [facility] Patient Portal, offered by [facility], by registering with the following website: [email or web address]

## 2019-08-05 NOTE — DISCHARGE NOTE NURSING/CASE MANAGEMENT/SOCIAL WORK - NSDCPEPTSTRK_GEN_ALL_CORE
Call 911 for stroke/Prescribed medications/Stroke education booklet/Stroke warning signs and symptoms/Signs and symptoms of stroke/Need for follow up after discharge/Risk factors for stroke/Stroke support groups for patients, families, and friends

## 2019-08-06 ENCOUNTER — APPOINTMENT (OUTPATIENT)
Dept: CARDIOLOGY | Facility: CLINIC | Age: 79
End: 2019-08-06

## 2019-08-09 DIAGNOSIS — N30.80 OTHER CYSTITIS WITHOUT HEMATURIA: ICD-10-CM

## 2019-08-09 DIAGNOSIS — B96.89 OTHER SPECIFIED BACTERIAL AGENTS AS THE CAUSE OF DISEASES CLASSIFIED ELSEWHERE: ICD-10-CM

## 2019-08-09 DIAGNOSIS — R10.13 EPIGASTRIC PAIN: ICD-10-CM

## 2019-08-09 DIAGNOSIS — Z99.3 DEPENDENCE ON WHEELCHAIR: ICD-10-CM

## 2019-08-09 DIAGNOSIS — E03.9 HYPOTHYROIDISM, UNSPECIFIED: ICD-10-CM

## 2019-08-09 DIAGNOSIS — I69.351 HEMIPLEGIA AND HEMIPARESIS FOLLOWING CEREBRAL INFARCTION AFFECTING RIGHT DOMINANT SIDE: ICD-10-CM

## 2019-08-09 DIAGNOSIS — Z79.02 LONG TERM (CURRENT) USE OF ANTITHROMBOTICS/ANTIPLATELETS: ICD-10-CM

## 2019-08-09 DIAGNOSIS — I10 ESSENTIAL (PRIMARY) HYPERTENSION: ICD-10-CM

## 2019-08-09 DIAGNOSIS — I48.91 UNSPECIFIED ATRIAL FIBRILLATION: ICD-10-CM

## 2019-08-09 NOTE — ED ADULT NURSE NOTE - NSSUHOSCREENINGYN_ED_ALL_ED
Petey Elizondo)  Gastroenterology; Internal Medicine  [address] [place] [address]  Phone: [phone]  Fax: [phone]  Follow Up Time: 2 weeks    [facility] Clinic  21st of August 2019. 12:30 pm  [facility] clinic   [address] [place] [id number]  Phone: [phone]  Fax: (   )    -  Follow Up Time: 2 weeks
Yes - the patient is able to be screened

## 2019-08-21 ENCOUNTER — APPOINTMENT (OUTPATIENT)
Dept: CARDIOLOGY | Facility: CLINIC | Age: 79
End: 2019-08-21

## 2019-08-21 PROBLEM — I48.91 UNSPECIFIED ATRIAL FIBRILLATION: Chronic | Status: ACTIVE | Noted: 2019-08-01

## 2019-08-21 PROBLEM — I61.9 NONTRAUMATIC INTRACEREBRAL HEMORRHAGE, UNSPECIFIED: Chronic | Status: ACTIVE | Noted: 2019-08-01

## 2019-08-21 PROBLEM — I10 ESSENTIAL (PRIMARY) HYPERTENSION: Chronic | Status: ACTIVE | Noted: 2019-08-01

## 2019-08-21 PROBLEM — M10.9 GOUT, UNSPECIFIED: Chronic | Status: ACTIVE | Noted: 2019-08-01

## 2019-08-21 PROBLEM — E03.9 HYPOTHYROIDISM, UNSPECIFIED: Chronic | Status: ACTIVE | Noted: 2019-08-01

## 2019-08-27 ENCOUNTER — APPOINTMENT (OUTPATIENT)
Dept: CARDIOLOGY | Facility: CLINIC | Age: 79
End: 2019-08-27
Payer: MEDICARE

## 2019-08-27 PROCEDURE — 93000 ELECTROCARDIOGRAM COMPLETE: CPT

## 2019-08-27 PROCEDURE — 99204 OFFICE O/P NEW MOD 45 MIN: CPT

## 2019-10-02 ENCOUNTER — APPOINTMENT (OUTPATIENT)
Dept: CARDIOLOGY | Facility: CLINIC | Age: 79
End: 2019-10-02

## 2019-10-10 ENCOUNTER — APPOINTMENT (OUTPATIENT)
Dept: CARDIOLOGY | Facility: CLINIC | Age: 79
End: 2019-10-10
Payer: MEDICARE

## 2019-10-10 VITALS
DIASTOLIC BLOOD PRESSURE: 87 MMHG | WEIGHT: 185 LBS | SYSTOLIC BLOOD PRESSURE: 136 MMHG | BODY MASS INDEX: 34.93 KG/M2 | HEART RATE: 75 BPM | HEIGHT: 61 IN

## 2019-10-10 DIAGNOSIS — I10 ESSENTIAL (PRIMARY) HYPERTENSION: ICD-10-CM

## 2019-10-10 DIAGNOSIS — E05.90 THYROTOXICOSIS, UNSPECIFIED W/OUT THYROTOXIC CRISIS OR STORM: ICD-10-CM

## 2019-10-10 DIAGNOSIS — Z80.51 FAMILY HISTORY OF MALIGNANT NEOPLASM OF KIDNEY: ICD-10-CM

## 2019-10-10 DIAGNOSIS — R00.2 PALPITATIONS: ICD-10-CM

## 2019-10-10 DIAGNOSIS — Z78.9 OTHER SPECIFIED HEALTH STATUS: ICD-10-CM

## 2019-10-10 PROCEDURE — 93000 ELECTROCARDIOGRAM COMPLETE: CPT

## 2019-10-10 PROCEDURE — 99204 OFFICE O/P NEW MOD 45 MIN: CPT

## 2019-10-10 RX ORDER — OMEGA-3-ACID ETHYL ESTERS 1 G/1
1 CAPSULE, LIQUID FILLED ORAL DAILY
Refills: 0 | Status: ACTIVE | COMMUNITY

## 2019-10-10 RX ORDER — LOSARTAN POTASSIUM 50 MG/1
50 TABLET, FILM COATED ORAL TWICE DAILY
Qty: 180 | Refills: 3 | Status: ACTIVE | COMMUNITY

## 2019-10-10 NOTE — HISTORY OF PRESENT ILLNESS
[FreeTextEntry1] : 78 y/o female with a history of hemorrhagic CVA on Xarelto, which has been discontinued and the patient is now managed with ASA and Plavix. No chest pain, but still with right-sided hemiparesis. No syncope. No palpitations. BP is controlled. No edema. No dyspnea.

## 2019-10-10 NOTE — REVIEW OF SYSTEMS
[Feeling Fatigued] : feeling fatigued [Blurry Vision] : blurred vision [Dysuria] : dysuria [Pelvic Pain] : pelvic pain [Joint Pain] : joint pain [Numbness (Hypesthesia)] : numbness [Limb Weakness (Paresis)] : limb weakness [see HPI] : see HPI [Negative] : Heme/Lymph

## 2019-10-10 NOTE — ASSESSMENT
[FreeTextEntry1] : A. fib\par not on anticoagulation due to prior hemorrhagic CVA (March 8, 2019). The CT scan form the [facility] ER - lacunar infarcts - no bleeding noted - refer to neurology to assess risks vs. benefits of AC.\par Will consider referral to Dr. Rollins for Watchman device.\par BP control. C/w amlodipine, losartan.\par re-start statin\par For now will stay on ASA and Plavix. F/u neurology recommendations.\par Return in 2-3 months.\par

## 2019-10-29 ENCOUNTER — APPOINTMENT (OUTPATIENT)
Dept: NEUROLOGY | Facility: CLINIC | Age: 79
End: 2019-10-29
Payer: MEDICARE

## 2019-10-29 VITALS
HEIGHT: 61 IN | HEART RATE: 67 BPM | BODY MASS INDEX: 34.93 KG/M2 | WEIGHT: 185 LBS | SYSTOLIC BLOOD PRESSURE: 126 MMHG | DIASTOLIC BLOOD PRESSURE: 81 MMHG

## 2019-10-29 DIAGNOSIS — Z87.891 PERSONAL HISTORY OF NICOTINE DEPENDENCE: ICD-10-CM

## 2019-10-29 PROCEDURE — 99204 OFFICE O/P NEW MOD 45 MIN: CPT

## 2019-10-29 RX ORDER — CRANBERRY FRUIT EXTRACT 200 MG
CAPSULE ORAL
Refills: 0 | Status: ACTIVE | COMMUNITY

## 2019-10-29 RX ORDER — ASPIRIN ENTERIC COATED TABLETS 81 MG 81 MG/1
81 TABLET, DELAYED RELEASE ORAL DAILY
Refills: 0 | Status: ACTIVE | COMMUNITY

## 2019-10-29 NOTE — PHYSICAL EXAM
[FreeTextEntry1] : A+Ox3 language and attention normal\par CN 2-12 normal slight assymetry in right face but corrects with smiling\par Right UE hemiparesis 4-/5 with more strength in extension\par RLE hemiparesis with 3/5 power with some strength in flexion\par FTN normal on left\par Gait unable to test\par \par NIHSS 6\par mrankin 4\par

## 2019-10-29 NOTE — HISTORY OF PRESENT ILLNESS
[FreeTextEntry1] : Ms. Naranjo is a 80yo woman with PMHX below presenting for follow up of an ICH she had while in [place] in 3/8/2019.  She fell out of bed and  caught her and she was taken to the hospital where she was found to have a thalamic ICH.  She had an MRI before this hemorrhage which was not remarkble according to the .  She was on xarelto when she had this hemorrhage and this was stopped.  Since she had paroxysmal afib she was no longer kept on anticoagulation and is currently on ASA and plavix\par She was in rehab for about 2 months and has been doing home PT till now.  Her OT finished a few months ago.\par She says she is depressed but has refused antidepressants multiple times\par Awaiting a brace for her leg to help her try to start walking.

## 2019-10-29 NOTE — DISCUSSION/SUMMARY
[FreeTextEntry1] : Ms. Naranjo is a 78yo woman with ICH in March 2019 likely secondary to hypertension. I donot have any of her images to review to determine if she would be a candidate for anticoagulation in the future.  I would need to review her prior MRI's to see if she has a high burden of cortical microhemorrhages which would make her risk on any anticoagulant high.\par She maybe a candidate for Watchmen procedure if she can tolerate anticoagulation for 45 days\par She may also be a candidate for evaluation of the Watchman trial with DAPT if she has a high burden of prior microhemorrhages\par 1. Obtain images of prior MRI's\par 2. F/u with Dr. Spears of EPS\par 3. Continue asa and plavix for now\par 4. Keep SBP <140\par 5. Increase PT\par 6. F/u in 6 months

## 2019-10-29 NOTE — REVIEW OF SYSTEMS
[Arm Weakness] : arm weakness [Leg Weakness] : leg weakness [Inability to Walk] : inability to walk [As Noted in HPI] : as noted in HPI [Arthralgias] : arthralgias [Joint Pain] : joint pain [Joint Stiffness] : joint stiffness [Negative] : Heme/Lymph

## 2019-11-19 ENCOUNTER — OTHER (OUTPATIENT)
Age: 79
End: 2019-11-19

## 2020-01-16 ENCOUNTER — APPOINTMENT (OUTPATIENT)
Dept: CARDIOLOGY | Facility: CLINIC | Age: 80
End: 2020-01-16
Payer: MEDICARE

## 2020-01-16 VITALS
HEIGHT: 61 IN | BODY MASS INDEX: 36.82 KG/M2 | HEART RATE: 56 BPM | SYSTOLIC BLOOD PRESSURE: 161 MMHG | DIASTOLIC BLOOD PRESSURE: 89 MMHG | WEIGHT: 195 LBS

## 2020-01-16 VITALS — DIASTOLIC BLOOD PRESSURE: 91 MMHG | SYSTOLIC BLOOD PRESSURE: 155 MMHG

## 2020-01-16 DIAGNOSIS — I10 ESSENTIAL (PRIMARY) HYPERTENSION: ICD-10-CM

## 2020-01-16 DIAGNOSIS — R42 DIZZINESS AND GIDDINESS: ICD-10-CM

## 2020-01-16 PROCEDURE — 99213 OFFICE O/P EST LOW 20 MIN: CPT

## 2020-01-16 PROCEDURE — 93000 ELECTROCARDIOGRAM COMPLETE: CPT

## 2020-01-16 RX ORDER — PANTOPRAZOLE 40 MG/1
40 TABLET, DELAYED RELEASE ORAL DAILY
Refills: 3 | Status: COMPLETED | COMMUNITY
End: 2020-01-16

## 2020-01-16 RX ORDER — ASPIRIN 81 MG/1
81 TABLET, COATED ORAL
Refills: 0 | Status: COMPLETED | COMMUNITY
End: 2020-01-16

## 2020-01-16 RX ORDER — HYDRALAZINE HYDROCHLORIDE 25 MG/1
25 TABLET ORAL
Qty: 180 | Refills: 3 | Status: COMPLETED | COMMUNITY
End: 2020-01-16

## 2020-01-16 RX ORDER — FEBUXOSTAT 40 MG/1
40 TABLET ORAL DAILY
Refills: 0 | Status: COMPLETED | COMMUNITY
End: 2020-01-16

## 2020-01-16 RX ORDER — HYDRALAZINE HYDROCHLORIDE 50 MG/1
50 TABLET ORAL TWICE DAILY
Refills: 0 | Status: ACTIVE | COMMUNITY

## 2020-01-16 NOTE — REVIEW OF SYSTEMS
[Blurry Vision] : blurred vision [Feeling Fatigued] : feeling fatigued [Pelvic Pain] : pelvic pain [Dysuria] : dysuria [Joint Pain] : joint pain [see HPI] : see HPI [Limb Weakness (Paresis)] : limb weakness [Numbness (Hypesthesia)] : numbness [Negative] : Heme/Lymph

## 2020-01-16 NOTE — HISTORY OF PRESENT ILLNESS
[FreeTextEntry1] : 78 y/o female with a history of hemorrhagic CVA on Xarelto, which has been discontinued and the patient is now managed with ASA and Plavix. No chest pain, but still with right-sided hemiparesis. + dizziness. No syncope. No palpitations. BP is controlled. No edema. No dyspnea. Was seen by Neurology. MRI noted. BP elevated today, but patient reports she took her medications late today, and usually her BP is normal.

## 2020-01-16 NOTE — ASSESSMENT
[FreeTextEntry1] : A. fib\par not on anticoagulation due to prior hemorrhagic CVA (March 8, 2019). The CT scan form the [facility] ER - lacunar infarcts - no bleeding noted - was seen by neurology to assess risks vs. benefits of AC.\par Will  refer to Dr. Rollins for Watchman device consideration.\par BP control. C/w amlodipine, losartan. If BP remains elevated, will increase Hydralazine to TID\par C/w statin\par For now will stay on ASA and Plavix. F/u neurology and EP recommendations.\par Discussed in detail.\par Return in 3 months.\par

## 2020-01-16 NOTE — PHYSICAL EXAM
[Well Groomed] : well groomed [Normal Appearance] : normal appearance [General Appearance - Well Nourished] : well nourished [General Appearance - In No Acute Distress] : no acute distress [Normal Conjunctiva] : the conjunctiva exhibited no abnormalities [Normal Oral Mucosa] : normal oral mucosa [No Oral Pallor] : no oral pallor [Normal Oropharynx] : normal oropharynx [Murmurs] : no murmurs present [Heart Rate And Rhythm] : heart rate and rhythm were normal [Heart Sounds] : normal S1 and S2 [] : no respiratory distress [Bowel Sounds] : normal bowel sounds [Auscultation Breath Sounds / Voice Sounds] : lungs were clear to auscultation bilaterally [Respiration, Rhythm And Depth] : normal respiratory rhythm and effort [Abdomen Tenderness] : non-tender [Abdomen Soft] : soft [FreeTextEntry1] : wheelchair [Cyanosis, Localized] : no localized cyanosis [Nail Clubbing] : no clubbing of the fingernails [Skin Turgor] : normal skin turgor [Petechial Hemorrhages (___cm)] : no petechial hemorrhages [Skin Color & Pigmentation] : normal skin color and pigmentation [Affect] : the affect was normal [Oriented To Time, Place, And Person] : oriented to person, place, and time

## 2020-02-21 ENCOUNTER — EMERGENCY (EMERGENCY)
Facility: HOSPITAL | Age: 80
LOS: 0 days | Discharge: HOME | End: 2020-02-21
Attending: EMERGENCY MEDICINE | Admitting: EMERGENCY MEDICINE
Payer: MEDICARE

## 2020-02-21 VITALS
DIASTOLIC BLOOD PRESSURE: 85 MMHG | OXYGEN SATURATION: 98 % | TEMPERATURE: 97 F | HEART RATE: 66 BPM | SYSTOLIC BLOOD PRESSURE: 160 MMHG | RESPIRATION RATE: 20 BRPM

## 2020-02-21 VITALS
HEART RATE: 85 BPM | SYSTOLIC BLOOD PRESSURE: 175 MMHG | TEMPERATURE: 97 F | DIASTOLIC BLOOD PRESSURE: 89 MMHG | OXYGEN SATURATION: 98 % | WEIGHT: 195.11 LBS | RESPIRATION RATE: 19 BRPM

## 2020-02-21 DIAGNOSIS — Z88.8 ALLERGY STATUS TO OTHER DRUGS, MEDICAMENTS AND BIOLOGICAL SUBSTANCES: ICD-10-CM

## 2020-02-21 DIAGNOSIS — Z88.5 ALLERGY STATUS TO NARCOTIC AGENT: ICD-10-CM

## 2020-02-21 DIAGNOSIS — R26.2 DIFFICULTY IN WALKING, NOT ELSEWHERE CLASSIFIED: ICD-10-CM

## 2020-02-21 DIAGNOSIS — I10 ESSENTIAL (PRIMARY) HYPERTENSION: ICD-10-CM

## 2020-02-21 DIAGNOSIS — E83.42 HYPOMAGNESEMIA: ICD-10-CM

## 2020-02-21 DIAGNOSIS — R53.1 WEAKNESS: ICD-10-CM

## 2020-02-21 DIAGNOSIS — N39.0 URINARY TRACT INFECTION, SITE NOT SPECIFIED: ICD-10-CM

## 2020-02-21 LAB
ALBUMIN SERPL ELPH-MCNC: 3.8 G/DL — SIGNIFICANT CHANGE UP (ref 3.5–5.2)
ALP SERPL-CCNC: 77 U/L — SIGNIFICANT CHANGE UP (ref 30–115)
ALT FLD-CCNC: 15 U/L — SIGNIFICANT CHANGE UP (ref 0–41)
ANION GAP SERPL CALC-SCNC: 13 MMOL/L — SIGNIFICANT CHANGE UP (ref 7–14)
APPEARANCE UR: ABNORMAL
APTT BLD: 30.6 SEC — SIGNIFICANT CHANGE UP (ref 27–39.2)
AST SERPL-CCNC: 15 U/L — SIGNIFICANT CHANGE UP (ref 0–41)
BACTERIA # UR AUTO: ABNORMAL
BASOPHILS # BLD AUTO: 0.03 K/UL — SIGNIFICANT CHANGE UP (ref 0–0.2)
BASOPHILS NFR BLD AUTO: 0.5 % — SIGNIFICANT CHANGE UP (ref 0–1)
BILIRUB SERPL-MCNC: 0.4 MG/DL — SIGNIFICANT CHANGE UP (ref 0.2–1.2)
BILIRUB UR-MCNC: NEGATIVE — SIGNIFICANT CHANGE UP
BUN SERPL-MCNC: 25 MG/DL — HIGH (ref 10–20)
CALCIUM SERPL-MCNC: 9.7 MG/DL — SIGNIFICANT CHANGE UP (ref 8.5–10.1)
CHLORIDE SERPL-SCNC: 99 MMOL/L — SIGNIFICANT CHANGE UP (ref 98–110)
CO2 SERPL-SCNC: 25 MMOL/L — SIGNIFICANT CHANGE UP (ref 17–32)
COLOR SPEC: YELLOW — SIGNIFICANT CHANGE UP
CREAT SERPL-MCNC: 1.2 MG/DL — SIGNIFICANT CHANGE UP (ref 0.7–1.5)
DIFF PNL FLD: ABNORMAL
EOSINOPHIL # BLD AUTO: 0.11 K/UL — SIGNIFICANT CHANGE UP (ref 0–0.7)
EOSINOPHIL NFR BLD AUTO: 2 % — SIGNIFICANT CHANGE UP (ref 0–8)
GLUCOSE SERPL-MCNC: 109 MG/DL — HIGH (ref 70–99)
GLUCOSE UR QL: NEGATIVE MG/DL — SIGNIFICANT CHANGE UP
HCT VFR BLD CALC: 39.2 % — SIGNIFICANT CHANGE UP (ref 37–47)
HGB BLD-MCNC: 12.6 G/DL — SIGNIFICANT CHANGE UP (ref 12–16)
IMM GRANULOCYTES NFR BLD AUTO: 0.4 % — HIGH (ref 0.1–0.3)
INR BLD: 0.98 RATIO — SIGNIFICANT CHANGE UP (ref 0.65–1.3)
KETONES UR-MCNC: NEGATIVE — SIGNIFICANT CHANGE UP
LEUKOCYTE ESTERASE UR-ACNC: SIGNIFICANT CHANGE UP
LYMPHOCYTES # BLD AUTO: 1.77 K/UL — SIGNIFICANT CHANGE UP (ref 1.2–3.4)
LYMPHOCYTES # BLD AUTO: 31.4 % — SIGNIFICANT CHANGE UP (ref 20.5–51.1)
MAGNESIUM SERPL-MCNC: 1.6 MG/DL — LOW (ref 1.8–2.4)
MCHC RBC-ENTMCNC: 30.5 PG — SIGNIFICANT CHANGE UP (ref 27–31)
MCHC RBC-ENTMCNC: 32.1 G/DL — SIGNIFICANT CHANGE UP (ref 32–37)
MCV RBC AUTO: 94.9 FL — SIGNIFICANT CHANGE UP (ref 81–99)
MONOCYTES # BLD AUTO: 0.55 K/UL — SIGNIFICANT CHANGE UP (ref 0.1–0.6)
MONOCYTES NFR BLD AUTO: 9.8 % — HIGH (ref 1.7–9.3)
NEUTROPHILS # BLD AUTO: 3.16 K/UL — SIGNIFICANT CHANGE UP (ref 1.4–6.5)
NEUTROPHILS NFR BLD AUTO: 55.9 % — SIGNIFICANT CHANGE UP (ref 42.2–75.2)
NITRITE UR-MCNC: NEGATIVE — SIGNIFICANT CHANGE UP
NRBC # BLD: 0 /100 WBCS — SIGNIFICANT CHANGE UP (ref 0–0)
PH UR: 7 — SIGNIFICANT CHANGE UP (ref 5–8)
PLATELET # BLD AUTO: 198 K/UL — SIGNIFICANT CHANGE UP (ref 130–400)
POTASSIUM SERPL-MCNC: 3.9 MMOL/L — SIGNIFICANT CHANGE UP (ref 3.5–5)
POTASSIUM SERPL-SCNC: 3.9 MMOL/L — SIGNIFICANT CHANGE UP (ref 3.5–5)
PROT SERPL-MCNC: 6.9 G/DL — SIGNIFICANT CHANGE UP (ref 6–8)
PROT UR-MCNC: 100 MG/DL
PROTHROM AB SERPL-ACNC: 11.3 SEC — SIGNIFICANT CHANGE UP (ref 9.95–12.87)
RBC # BLD: 4.13 M/UL — LOW (ref 4.2–5.4)
RBC # FLD: 14.4 % — SIGNIFICANT CHANGE UP (ref 11.5–14.5)
RBC CASTS # UR COMP ASSIST: SIGNIFICANT CHANGE UP /HPF
SODIUM SERPL-SCNC: 137 MMOL/L — SIGNIFICANT CHANGE UP (ref 135–146)
SP GR SPEC: 1.01 — SIGNIFICANT CHANGE UP (ref 1.01–1.03)
UROBILINOGEN FLD QL: 0.2 MG/DL — SIGNIFICANT CHANGE UP (ref 0.2–0.2)
WBC # BLD: 5.64 K/UL — SIGNIFICANT CHANGE UP (ref 4.8–10.8)
WBC # FLD AUTO: 5.64 K/UL — SIGNIFICANT CHANGE UP (ref 4.8–10.8)
WBC UR QL: >50 /HPF

## 2020-02-21 PROCEDURE — 70450 CT HEAD/BRAIN W/O DYE: CPT | Mod: 26

## 2020-02-21 PROCEDURE — 73502 X-RAY EXAM HIP UNI 2-3 VIEWS: CPT | Mod: 26,RT

## 2020-02-21 PROCEDURE — 99284 EMERGENCY DEPT VISIT MOD MDM: CPT

## 2020-02-21 PROCEDURE — 71045 X-RAY EXAM CHEST 1 VIEW: CPT | Mod: 26

## 2020-02-21 RX ORDER — MAGNESIUM OXIDE 400 MG ORAL TABLET 241.3 MG
400 TABLET ORAL ONCE
Refills: 0 | Status: COMPLETED | OUTPATIENT
Start: 2020-02-21 | End: 2020-02-21

## 2020-02-21 RX ORDER — CEFPODOXIME PROXETIL 100 MG
200 TABLET ORAL ONCE
Refills: 0 | Status: COMPLETED | OUTPATIENT
Start: 2020-02-21 | End: 2020-02-21

## 2020-02-21 RX ORDER — CEFPODOXIME PROXETIL 100 MG
1 TABLET ORAL
Qty: 14 | Refills: 0
Start: 2020-02-21 | End: 2020-02-27

## 2020-02-21 RX ADMIN — Medication 200 MILLIGRAM(S): at 15:53

## 2020-02-21 RX ADMIN — MAGNESIUM OXIDE 400 MG ORAL TABLET 400 MILLIGRAM(S): 241.3 TABLET ORAL at 15:53

## 2020-02-21 NOTE — ED PROVIDER NOTE - OBJECTIVE STATEMENT
Patient BIBA from home. C/o right hip pain, fatigue. HA. Sx started 1 year ago after CVA. no pain or HA at this time. No fever, no chest pain, no SOB, Mild low abdominal pressure.

## 2020-02-21 NOTE — ED PROVIDER NOTE - ATTENDING CONTRIBUTION TO CARE
generalized weakness.  VS noted.  Chest clear.  Heart RR no murmur.  abd NT.  Ext FROM.  =pulses. dx testing reviewed. po abx ordered.

## 2020-02-21 NOTE — ED ADULT TRIAGE NOTE - CHIEF COMPLAINT QUOTE
BIBA from home pt has hx of stroke in the past. Pt states she has a cloudiness in her head for a couple days. Pt c/o right side hip pain. Pt has an aide at bedside. Pt aide states pt cant hold her head up at times that she is very weak.

## 2020-02-21 NOTE — ED PROVIDER NOTE - PATIENT PORTAL LINK FT
You can access the FollowMyHealth Patient Portal offered by [facility] by registering at the following website: [email or web address]. By joining [employer]’s FollowMyHealth portal, you will also be able to view your health information using other applications (apps) compatible with our system.

## 2020-02-23 LAB
CULTURE RESULTS: SIGNIFICANT CHANGE UP
SPECIMEN SOURCE: SIGNIFICANT CHANGE UP

## 2020-03-20 ENCOUNTER — APPOINTMENT (OUTPATIENT)
Dept: CARDIOLOGY | Facility: CLINIC | Age: 80
End: 2020-03-20

## 2020-04-14 ENCOUNTER — APPOINTMENT (OUTPATIENT)
Dept: NEUROLOGY | Facility: CLINIC | Age: 80
End: 2020-04-14
Payer: MEDICARE

## 2020-04-14 PROCEDURE — 99214 OFFICE O/P EST MOD 30 MIN: CPT | Mod: 95

## 2020-04-14 NOTE — HISTORY OF PRESENT ILLNESS
[FreeTextEntry1] : Patient visit was a telehealth visit due too the current COVID19 outbreak and was through the [employer] system.  Consent was obtained from patient,  and daughter for the video/audio evaluation.\par Ms. 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  Since the last visit she has been doing PT however for last 1-2 weeks it has stopped.  She is still mostly wheel chair bound and wakes in the morning with headaches.  She was found to have hemorrhagic products and last MRI brain in 12/2019 and sinus disease.  The headaches are likely due too her sinuses and she complains daily of congestion in her nose.\par She has prescription for Flonase which she hasn’t used in a while and I sent in a refill for this for her to use.   She has not been sick recently and only other additional complaint is that she gets winded easily and will sweat usually in the evenings.\par

## 2020-04-14 NOTE — DISCUSSION/SUMMARY
[FreeTextEntry1] : Ms. Naranjo is a 80 woman with history of hemorrhagic stroke and afib currently stable with no significant new neuroloigcal issues.  For sinus issues I prescribed Flonase daily.  She should also increase her PT to daily with the family help which I discussed with daughter and \par 1. Continue current meds\par 2. Flonase 1 spray in each nostril daily\par 3. Continue PT and increase to daily as tolerated\par 4 f/u in 3-4 months after COVID19 pandemic has ended\par

## 2020-04-14 NOTE — PHYSICAL EXAM
[FreeTextEntry1] : Limited physical exam performed via two way audio/visual services\par Right facial mild dysarthria\par Drift of RUE with slow hand opening and closing\par EOMI\par RLE unable to lift against gravity\par Unable to ambulate

## 2020-04-20 ENCOUNTER — APPOINTMENT (OUTPATIENT)
Dept: NEUROLOGY | Facility: CLINIC | Age: 80
End: 2020-04-20

## 2020-04-28 ENCOUNTER — APPOINTMENT (OUTPATIENT)
Dept: CARDIOLOGY | Facility: CLINIC | Age: 80
End: 2020-04-28
Payer: MEDICARE

## 2020-04-28 PROCEDURE — 99213 OFFICE O/P EST LOW 20 MIN: CPT | Mod: 95

## 2020-04-28 NOTE — PHYSICAL EXAM
[Normal Appearance] : normal appearance [General Appearance - In No Acute Distress] : no acute distress [General Appearance - Well Nourished] : well nourished [Well Groomed] : well groomed [FreeTextEntry1] : right hemiparesis, wheelchair bound [] : no respiratory distress [Oriented To Time, Place, And Person] : oriented to person, place, and time [Affect] : the affect was normal

## 2020-04-28 NOTE — REVIEW OF SYSTEMS
[Feeling Fatigued] : feeling fatigued [Blurry Vision] : blurred vision [Joint Pain] : joint pain [Limb Weakness (Paresis)] : limb weakness [Numbness (Hypesthesia)] : numbness [see HPI] : see HPI [Negative] : Heme/Lymph

## 2020-04-28 NOTE — HISTORY OF PRESENT ILLNESS
[Home] : at home, [unfilled] , at the time of the visit. [Spouse] : spouse [Patient] : the patient [FreeTextEntry1] : Telemedicine visit:\par \par 81 y/o female with a history of hemorrhagic CVA on Xarelto, which has been discontinued and the patient is now managed with ASA and Plavix. No chest pain, but still with right-sided hemiparesis. Wheelchair bound, undergoing home PT. + dizziness. No syncope. No palpitations. BP is controlled. No edema. No dyspnea. Followed by Neurology. MRI noted. [Self] : self

## 2020-04-28 NOTE — ASSESSMENT
[FreeTextEntry1] : Due to COVID-19 epidemic, a telemedicine visit was conducted, using audio and video connection. Patient gave verbal consent to telemedicine visit.\par \par A. fib\par not on anticoagulation due to prior hemorrhagic CVA (March 8, 2019).\par Will  refer to Dr. Rollins for Watchman device consideration, after the COVID-19 epidemic is over.\par BP control. C/w amlodipine, losartan. If BP remains elevated, will increase Hydralazine to TID\par C/w statin\par C/w PT/OT\par For now will c/w ASA and Plavix. F/u neurology and EP recommendations.\par Discussed with patient and .\par Return in 3 months.\par

## 2020-05-27 ENCOUNTER — APPOINTMENT (OUTPATIENT)
Dept: NEUROLOGY | Facility: CLINIC | Age: 80
End: 2020-05-27

## 2021-02-22 ENCOUNTER — APPOINTMENT (OUTPATIENT)
Dept: NEUROLOGY | Facility: CLINIC | Age: 81
End: 2021-02-22
Payer: MEDICARE

## 2021-02-22 PROCEDURE — 99213 OFFICE O/P EST LOW 20 MIN: CPT | Mod: 95

## 2021-02-22 RX ORDER — MIRABEGRON 50 MG/1
50 TABLET, FILM COATED, EXTENDED RELEASE ORAL
Qty: 90 | Refills: 0 | Status: ACTIVE | COMMUNITY
Start: 2021-01-05

## 2021-02-22 NOTE — DISCUSSION/SUMMARY
[Intensive Blood Pressure Control] : intensive blood pressure control [Patient encouraged to discuss with Primary MD] : I encouraged the patient to discuss these important issues with ~his/her~ primary care doctor [Goals and Counseling] : I have reviewed the goals of stroke risk factor modification. I counseled the patient on measures to reduce stroke risk, including the importance of medication compliance, risk factor control, exercise, healthy diet and avoidance of smoking. I reviewed stroke warning signs and symptoms and appropriate actions to take if such occur. [FreeTextEntry1] : Ms. Naranjo is a 80 woman with history of hemorrhagic stroke and afib currently stable with no significant new neuroloigcal issues.\par 1. No change in medications\par 2. Continue PT/OT\par 3. HHA evaluation with VN\par 4. monitor behavior for signs of dementia\par 5. f/u in 6 months\par

## 2021-02-22 NOTE — PHYSICAL EXAM
[FreeTextEntry1] : a+Ox2 naming and repeition normal\par Mild dysarthria\par No movement right arm except at shoulder\par No movement right leg except slightly in toe\par Sensation symmetric and left side moving normally\par

## 2021-02-22 NOTE — REVIEW OF SYSTEMS
[Feeling Poorly] : feeling poorly [As Noted in HPI] : as noted in HPI [Depression] : depression [Dysuria] : dysuria [Joint Pain] : joint pain [Negative] : Gastrointestinal

## 2021-02-22 NOTE — HISTORY OF PRESENT ILLNESS
[FreeTextEntry1] : Ms. 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  She has been doing intermittent PT/OT over last few months and just restarted OT again.  Has no real movement on right side still but can shrug her right shoulder and some movement in right toes.\par Has urinary fullness feeling when laying down and was put on Mybetriq which has not helped.\par

## 2021-05-08 NOTE — PROGRESS NOTE ADULT - SUBJECTIVE AND OBJECTIVE BOX
Progress Note:  Provider Speciality                            Hospitalist      TUSHAR COLE [id number] 79y Female     CHIEF PRESENTING COMPLAINT:  Patient is a 79y old  Female who presents with a chief complaint of gen.  weakness.       SUBJECTIVE:  Patient was seen and examined at bedside.  reports doing better    No significant overnight events reported.     HISTORY OF PRESENTING ILLNESS:  HPI:  · HPI Objective Statement: The pt is a 79y Female with PMH hemorrhagic stroke in March with residual right sided deficits, HTN, hypothyroidism, afib on plavix is presenting to ED with generalized weakness x 1 day. Pt states this am she felt just generalized weakness and overall malaise. pt endorses mild intermittent dizziness and HA that is unchanged from stroke in march. Pt states doctor came yesterday and was concerned for UTI, she took a urine sample and started her on macrobid. pt took 1 dose today. pt denies f/c/n/v/d, abd pain, fall, cp, sob, urinary symptoms, new focal deficits. pt is nonambulatory at baseline.	    PAST MEDICAL/SURGICAL/FAMILY/SOCIAL HISTORY:    Past Medica (01 Aug 2019 23:12)      PAST MEDICAL & SURGICAL HISTORY:  PAST MEDICAL & SURGICAL HISTORY:  Hemorrhagic stroke  Afib  Hypothyroidism  Gout  HTN (hypertension)  No significant past surgical history      VITAL SIGNS:  Vital Signs Last 24 Hrs  T(C): 36.1 (02 Aug 2019 14:25), Max: 36.9 (01 Aug 2019 17:48)  T(F): 96.9 (02 Aug 2019 14:25), Max: 98.4 (01 Aug 2019 17:48)  HR: 72 (02 Aug 2019 14:25) (70 - 88)  BP: 109/56 (02 Aug 2019 14:25) (109/56 - 160/90)  BP(mean): --  RR: 16 (02 Aug 2019 14:25) (16 - 18)  SpO2: 96% (02 Aug 2019 10:39) (96% - 98%)    PHYSICAL EXAMINATION:  General: Awake  , alert  , Not in acute distress  HEENT:  JESU, EOMI  Heart: S1+S2 audible, no murmur  Lungs: bilateral  fair air entry, no wheezing, no crepitations.  Abdomen: Soft, non-tender, non-distended  CNS:  right sided weakness from prior CVA   Extremities:  No edema          REVIEW OF SYSTEMS:    At least 10 systems were reviewed in ROS. All systems reviewed  are within normal limits except for the complaints as described in Subjective.    CONSULTS:  Consultant(s) Notes Reviewed by me.   Care Discussed with Consultants/Other Providers where required.        MEDICATIONS:  MEDICATIONS  (STANDING):  amLODIPine   Tablet 5 milliGRAM(s) Oral daily  cefTRIAXone   IVPB 1000 milliGRAM(s) IV Intermittent every 24 hours  clopidogrel Tablet 75 milliGRAM(s) Oral daily  dextrose 5% + sodium chloride 0.45%. 1000 milliLiter(s) (75 mL/Hr) IV Continuous <Continuous>  docusate sodium 100 milliGRAM(s) Oral three times a day  flecainide 50 milliGRAM(s) Oral two times a day  heparin  Injectable 5000 Unit(s) SubCutaneous every 12 hours  hydrALAZINE 50 milliGRAM(s) Oral daily  hydrALAZINE 25 milliGRAM(s) Oral at bedtime  levothyroxine 25 MICROGram(s) Oral daily  losartan 50 milliGRAM(s) Oral two times a day  pantoprazole    Tablet 40 milliGRAM(s) Oral before breakfast  polyethylene glycol 3350 17 Gram(s) Oral two times a day  senna 2 Tablet(s) Oral at bedtime    MEDICATIONS  (PRN):  acetaminophen   Tablet .. 650 milliGRAM(s) Oral every 6 hours PRN Temp greater or equal to 38C (100.4F), Mild Pain (1 - 3)  aluminum hydroxide/magnesium hydroxide/simethicone Suspension 30 milliLiter(s) Oral every 6 hours PRN Dyspepsia  bisacodyl Suppository 10 milliGRAM(s) Rectal daily PRN Constipation      LABOROTORY DATA/MICROBIOLOGY/I & O's:                        13.0   6.43  )-----------( 184      ( 01 Aug 2019 18:56 )             40.8         139  |  101  |  15  ----------------------------<  99  4.1   |  25  |  1.0    Ca    9.3      01 Aug 2019 18:56    TPro  6.8  /  Alb  3.5  /  TBili  0.4  /  DBili  x   /  AST  19  /  ALT  11  /  AlkPhos  90      PT/INR - ( 01 Aug 2019 18:56 )   PT: 11.80 sec;   INR: 1.03 ratio         PTT - ( 01 Aug 2019 18:56 )  PTT:25.4 sec  Urinalysis Basic - ( 01 Aug 2019 18:56 )    Color: Yellow / Appearance: Cloudy / S.015 / pH: x  Gluc: x / Ketone: Trace  / Bili: Negative / Urobili: 0.2 mg/dL   Blood: x / Protein: 100 mg/dL / Nitrite: Positive   Leuk Esterase: Large / RBC: 1-2 /HPF / WBC 10-25 /HPF   Sq Epi: x / Non Sq Epi: Few /HPF / Bacteria: TNTC /HPF      CAPILLARY BLOOD GLUCOSE            Urinalysis Basic - ( 01 Aug 2019 18:56 )    Color: Yellow / Appearance: Cloudy / S.015 / pH: x  Gluc: x / Ketone: Trace  / Bili: Negative / Urobili: 0.2 mg/dL   Blood: x / Protein: 100 mg/dL / Nitrite: Positive   Leuk Esterase: Large / RBC: 1-2 /HPF / WBC 10-25 /HPF   Sq Epi: x / Non Sq Epi: Few /HPF / Bacteria: TNTC /HPF                    ASSESSMENT:     The pt is a 79y Female with PMH hemorrhagic stroke in March with residual right sided deficits, HTN, hypothyroidism, afib on xarelto  is presenting to ED with generalized weakness with dizziness for one day.     Emphysematous cystitis/UTI   A-fib on xarelto   atypical/ reproducible left sided chest pain with dyspepsia like symptoms  h/o hemorrhagic CVA with residual right sided weakness  HTN   Hypothyroidism   constipation     Plan:    c/w IV ABX , rocephin   UCX growing GNR's/ follow up sensitivities   urology consult appreciated - c/w [place] cath and discharge with it and fu as OP with urology   Cardio eval appreciated - EKG noted - no acute changes/trop negative/repeat trop at 3pm    maalox prn for dyspepsia  continue to hold xarelto as per Cardio with hemorrhagic CVA h/o   c/w hydralazine losartan / norvasc for HTN   c/w flecainide for HR control   bowel regimen/c/w miralax bid if no BM / bisacodyl suppository   c/w synthroid     #Progress Note Handoff  Pending :  clinical improvement/ UCX sensitivities   Disposition:  home when stable   Discussion: Discussed with patient -satisfied Progress Note:  Provider Speciality                            Hospitalist      TUSHAR COLE [id number] 79y Female     CHIEF PRESENTING COMPLAINT:  Patient is a 79y old  Female who presents with a chief complaint of gen.  weakness.       SUBJECTIVE:  Patient was seen and examined at bedside.  reports doing better/ unclear whether had bm so far or not ?? / denies any further chest pain    No significant overnight events reported.     HISTORY OF PRESENTING ILLNESS:  HPI:  · HPI Objective Statement: The pt is a 79y Female with PMH hemorrhagic stroke in March with residual right sided deficits, HTN, hypothyroidism, afib on plavix is presenting to ED with generalized weakness x 1 day. Pt states this am she felt just generalized weakness and overall malaise. pt endorses mild intermittent dizziness and HA that is unchanged from stroke in march. Pt states doctor came yesterday and was concerned for UTI, she took a urine sample and started her on macrobid. pt took 1 dose today. pt denies f/c/n/v/d, abd pain, fall, cp, sob, urinary symptoms, new focal deficits. pt is nonambulatory at baseline.	    PAST MEDICAL/SURGICAL/FAMILY/SOCIAL HISTORY:    Past Medica (01 Aug 2019 23:12)      PAST MEDICAL & SURGICAL HISTORY:  PAST MEDICAL & SURGICAL HISTORY:  Hemorrhagic stroke  Afib  Hypothyroidism  Gout  HTN (hypertension)  No significant past surgical history      Vital Signs Last 24 Hrs  T(C): 36.3 (04 Aug 2019 05:04), Max: 36.7 (03 Aug 2019 21:41)  T(F): 97.4 (04 Aug 2019 05:04), Max: 98 (03 Aug 2019 21:41)  HR: 70 (04 Aug 2019 05:04) (70 - 75)  BP: 157/77 (04 Aug 2019 05:04) (132/68 - 157/77)  BP(mean): --  RR: 16 (04 Aug 2019 05:04) (16 - 16)  SpO2: --      PHYSICAL EXAMINATION:  General: Awake  , alert  , Not in acute distress  HEENT:  JESU, EOMI  Heart: S1+S2 audible, no murmur  Lungs: bilateral  fair air entry, no wheezing, no crepitations.  Abdomen: Soft, non-tender, non-distended  CNS:  right sided weakness from prior CVA   : [place] cath with clear urine   Extremities:  No edema          REVIEW OF SYSTEMS:    At least 10 systems were reviewed in ROS. All systems reviewed  are within normal limits except for the complaints as described in Subjective.    CONSULTS:  Consultant(s) Notes Reviewed by me.   Care Discussed with Consultants/Other Providers where required.        MEDICATIONS:  MEDICATIONS  (STANDING):  amLODIPine   Tablet 5 milliGRAM(s) Oral daily  cefTRIAXone   IVPB 1000 milliGRAM(s) IV Intermittent every 24 hours  clopidogrel Tablet 75 milliGRAM(s) Oral daily  dextrose 5% + sodium chloride 0.45%. 1000 milliLiter(s) (75 mL/Hr) IV Continuous <Continuous>  docusate sodium 100 milliGRAM(s) Oral three times a day  flecainide 50 milliGRAM(s) Oral two times a day  heparin  Injectable 5000 Unit(s) SubCutaneous every 12 hours  hydrALAZINE 50 milliGRAM(s) Oral daily  hydrALAZINE 25 milliGRAM(s) Oral at bedtime  levothyroxine 25 MICROGram(s) Oral daily  losartan 50 milliGRAM(s) Oral two times a day  pantoprazole    Tablet 40 milliGRAM(s) Oral before breakfast  polyethylene glycol 3350 17 Gram(s) Oral two times a day  senna 2 Tablet(s) Oral at bedtime    MEDICATIONS  (PRN):  acetaminophen   Tablet .. 650 milliGRAM(s) Oral every 6 hours PRN Temp greater or equal to 38C (100.4F), Mild Pain (1 - 3)  aluminum hydroxide/magnesium hydroxide/simethicone Suspension 30 milliLiter(s) Oral every 6 hours PRN Dyspepsia  bisacodyl Suppository 10 milliGRAM(s) Rectal daily PRN Constipation      LABOROTORY DATA/MICROBIOLOGY/I & O's:                        13.0   6.43  )-----------( 184      ( 01 Aug 2019 18:56 )             40.8         139  |  101  |  15  ----------------------------<  99  4.1   |  25  |  1.0    Ca    9.3      01 Aug 2019 18:56    TPro  6.8  /  Alb  3.5  /  TBili  0.4  /  DBili  x   /  AST  19  /  ALT  11  /  AlkPhos  90      PT/INR - ( 01 Aug 2019 18:56 )   PT: 11.80 sec;   INR: 1.03 ratio         PTT - ( 01 Aug 2019 18:56 )  PTT:25.4 sec  Urinalysis Basic - ( 01 Aug 2019 18:56 )    Color: Yellow / Appearance: Cloudy / S.015 / pH: x  Gluc: x / Ketone: Trace  / Bili: Negative / Urobili: 0.2 mg/dL   Blood: x / Protein: 100 mg/dL / Nitrite: Positive   Leuk Esterase: Large / RBC: 1-2 /HPF / WBC 10-25 /HPF   Sq Epi: x / Non Sq Epi: Few /HPF / Bacteria: TNTC /HPF      CAPILLARY BLOOD GLUCOSE            Urinalysis Basic - ( 01 Aug 2019 18:56 )    Color: Yellow / Appearance: Cloudy / S.015 / pH: x  Gluc: x / Ketone: Trace  / Bili: Negative / Urobili: 0.2 mg/dL   Blood: x / Protein: 100 mg/dL / Nitrite: Positive   Leuk Esterase: Large / RBC: 1-2 /HPF / WBC 10-25 /HPF   Sq Epi: x / Non Sq Epi: Few /HPF / Bacteria: TNTC /HPF                    ASSESSMENT:     The pt is a 79y Female with PMH hemorrhagic stroke in March with residual right sided deficits, HTN, hypothyroidism, afib on xarelto  is presenting to ED with generalized weakness with dizziness for one day.     Emphysematous cystitis/UTI   A-fib on xarelto   atypical/ reproducible left sided chest pain with dyspepsia like symptoms-improved   h/o hemorrhagic CVA with residual right sided weakness  HTN   Hypothyroidism   constipation     Plan:    afebrile  UCX sensitivities noted: growing E-coli and proteus mirabilis- sensitivities noted    switch to po vantin for 6 more days   urology consult appreciated - c/w [facility] cath and discharge with it and fu as OP with urology   Cardio eval appreciated - EKG noted - no acute changes/trop negative x 2.   maalox prn for dyspepsia  continue to hold xarelto as per Cardio with hemorrhagic CVA h/o   c/w hydralazine losartan / norvasc for HTN   c/w flecainide for HR control   bowel regimen/c/w miralax bid if no BM / bisacodyl suppository   c/w synthroid     PT eval/ rehab consult     #Progress Note Handoff  Pending :  monitor for defervescence on oral ABX/ possible home care arrangement tomorrow with deficits from prior cva  Disposition:  home when stable   Discussion: Discussed with patient -satisfied Hypertension, unspecified type

## 2021-05-11 NOTE — ED ADULT NURSE NOTE - NSSEPSISSUSPECTED_ED_A_ED
Apixaban/Eliquis increases your risk for bleeding. Notify your doctor if you experience any of the following side effects: bleeding, coughing or vomiting blood, red or black stool, unexpected pain or swelling, itching or hives, chest pain, chest tightness, trouble breathing, changes in how much or how often you urinate, red or pink urine, numbness or tingling in your feet, or unusual muscle weakness. When Apixaban/Eliquis is taken with other medicines, they can affect how it works. Taking other medications such as aspirin, blood thinners, nonsteroidal anti-inflammatories, and medications that treat depression can increase your risk of bleeding. It is very important to tell your health care provider about all of the other medicines, including over-the-counter medications, herbs, and vitamins you are taking. DO NOT start, stop, or change the dosage of any medicine, including over-the-counter medicines, vitamins, and herbal products without your doctor’s approval. Any products containing aspirin or are nonsteroidal anti-inflammatories lessen the blood’s ability to form clots and add to the effect of Apixaban/Eliquis. Never take aspirin or medicines that contain aspirin without speaking to your doctor.
No

## 2021-05-24 PROBLEM — I51.7 MILD CONCENTRIC LEFT VENTRICULAR HYPERTROPHY (LVH): Status: ACTIVE | Noted: 2021-05-24

## 2021-05-27 ENCOUNTER — APPOINTMENT (OUTPATIENT)
Dept: CARDIOLOGY | Facility: CLINIC | Age: 81
End: 2021-05-27
Payer: MEDICARE

## 2021-05-27 VITALS
BODY MASS INDEX: 39.65 KG/M2 | WEIGHT: 210 LBS | SYSTOLIC BLOOD PRESSURE: 112 MMHG | DIASTOLIC BLOOD PRESSURE: 62 MMHG | HEIGHT: 61 IN

## 2021-05-27 DIAGNOSIS — I51.7 CARDIOMEGALY: ICD-10-CM

## 2021-05-27 DIAGNOSIS — I48.91 UNSPECIFIED ATRIAL FIBRILLATION: ICD-10-CM

## 2021-05-27 PROCEDURE — 93000 ELECTROCARDIOGRAM COMPLETE: CPT

## 2021-05-27 PROCEDURE — 99204 OFFICE O/P NEW MOD 45 MIN: CPT

## 2021-05-27 PROCEDURE — 99214 OFFICE O/P EST MOD 30 MIN: CPT

## 2021-05-27 RX ORDER — AMLODIPINE BESYLATE 5 MG/1
5 TABLET ORAL DAILY
Qty: 90 | Refills: 3 | Status: ACTIVE | COMMUNITY

## 2021-05-27 RX ORDER — FLUTICASONE PROPIONATE 50 UG/1
50 SPRAY, METERED NASAL DAILY
Qty: 1 | Refills: 0 | Status: DISCONTINUED | COMMUNITY
Start: 2020-04-14 | End: 2021-05-27

## 2021-05-27 RX ORDER — CLOPIDOGREL 75 MG/1
75 TABLET, FILM COATED ORAL DAILY
Qty: 90 | Refills: 3 | Status: DISCONTINUED | COMMUNITY
End: 2021-05-27

## 2021-05-27 RX ORDER — ALLOPURINOL 100 MG/1
100 TABLET ORAL DAILY
Qty: 30 | Refills: 3 | Status: ACTIVE | COMMUNITY

## 2021-05-27 RX ORDER — LOVASTATIN 20 MG/1
20 TABLET ORAL
Qty: 90 | Refills: 3 | Status: ACTIVE | COMMUNITY
Start: 2019-10-10

## 2021-05-27 RX ORDER — LEVOTHYROXINE SODIUM 25 UG/1
25 TABLET ORAL DAILY
Refills: 0 | Status: DISCONTINUED | COMMUNITY
End: 2021-05-27

## 2021-05-27 RX ORDER — NITROFURANTOIN MACROCRYSTALS 100 MG/1
100 CAPSULE ORAL
Qty: 14 | Refills: 0 | Status: DISCONTINUED | COMMUNITY
Start: 2020-11-24 | End: 2021-05-27

## 2021-05-27 RX ORDER — METOPROLOL SUCCINATE 25 MG/1
25 TABLET, EXTENDED RELEASE ORAL DAILY
Qty: 90 | Refills: 3 | Status: ACTIVE | COMMUNITY

## 2021-05-27 NOTE — PHYSICAL EXAM
[Well Developed] : well developed [Obese] : obese [Normal Conjunctiva] : normal conjunctiva [JVP Elevated ___cm] : the JVP was not elevated [Normal S1, S2] : normal S1, S2 [No Murmur] : no murmur [Clear Lung Fields] : clear lung fields [Soft] : abdomen soft [Normal Gait] : normal gait [No Rash] : no rash [Alert and Oriented] : alert and oriented [de-identified] : tr  - 1 + edemaedema [de-identified] : rt side weak

## 2021-05-27 NOTE — REVIEW OF SYSTEMS
[Fever] : fever [Blurry Vision] : blurred vision [Hearing Loss] : hearing loss [SOB] : no shortness of breath [Dyspnea on exertion] : dyspnea during exertion [Cough] : no cough [Abdominal Pain] : no abdominal pain [Diarrhea] : diarrhea [Constipation] : constipation [Dysuria] : dysuria [Joint Pain] : no joint pain [Rash] : no rash [Dizziness] : no dizziness [Confusion] : confusion was observed [Easy Bleeding] : no tendency for easy bleeding

## 2021-05-27 NOTE — ASSESSMENT
[FreeTextEntry1] : Patient with hx afib. She had cva because bleed . No off xarelto. On asa and plavix. She has memory issues. She has htn. Probable katarzyna. Told try lose weight. To get OT. She is bed to chair. Reviewed watchman. Family does not want now. Because would still need period on xarelto

## 2021-06-11 ENCOUNTER — APPOINTMENT (OUTPATIENT)
Dept: NEUROLOGY | Facility: CLINIC | Age: 81
End: 2021-06-11
Payer: MEDICARE

## 2021-06-11 VITALS
DIASTOLIC BLOOD PRESSURE: 77 MMHG | WEIGHT: 210 LBS | OXYGEN SATURATION: 97 % | BODY MASS INDEX: 39.65 KG/M2 | SYSTOLIC BLOOD PRESSURE: 144 MMHG | HEIGHT: 61 IN | HEART RATE: 61 BPM

## 2021-06-11 PROCEDURE — 99213 OFFICE O/P EST LOW 20 MIN: CPT

## 2021-06-14 NOTE — HISTORY OF PRESENT ILLNESS
[FreeTextEntry1] : Ms. 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  She was doing intermittent PT/OT but PT stopped earlier this year and OT just restarted.  Since stopping PT she has become totally non-ambulatory and needs assistance to even pivot in and out of chair and bed.\par \par Has had some behavioral issues where she will lose her temper but it has not become a common problem as of now.

## 2021-06-14 NOTE — PHYSICAL EXAM
[FreeTextEntry1] : a+Ox2 naming and repetition normal\par Mild dysarthria\par No movement right arm except at shoulder\par No movement right leg except slightly in toe\par Sensation symmetric and left side moving normally\par

## 2021-06-14 NOTE — DISCUSSION/SUMMARY
[FreeTextEntry1] : Ms. Naranjo is a 81 woman with history of hemorrhagic stroke and afib currently stable with no significant new neurological issues.\par 1. No change in medications\par 2. Continue PT/OT (Restart PT as she has worsened with gait and ambulation which was slowly improving \par 3. HHA evaluation with VN\par 4. monitor behavior for signs of dementia\par 5. f/u in 6 months\par

## 2021-08-23 ENCOUNTER — APPOINTMENT (OUTPATIENT)
Dept: CARDIOLOGY | Facility: CLINIC | Age: 81
End: 2021-08-23
Payer: MEDICARE

## 2021-08-23 VITALS — SYSTOLIC BLOOD PRESSURE: 128 MMHG | DIASTOLIC BLOOD PRESSURE: 68 MMHG | HEIGHT: 61 IN

## 2021-08-23 DIAGNOSIS — I61.9 NONTRAUMATIC INTRACEREBRAL HEMORRHAGE, UNSPECIFIED: ICD-10-CM

## 2021-08-23 DIAGNOSIS — I48.91 UNSPECIFIED ATRIAL FIBRILLATION: ICD-10-CM

## 2021-08-23 DIAGNOSIS — I10 ESSENTIAL (PRIMARY) HYPERTENSION: ICD-10-CM

## 2021-08-23 DIAGNOSIS — I51.89 OTHER ILL-DEFINED HEART DISEASES: ICD-10-CM

## 2021-08-23 DIAGNOSIS — E78.00 PURE HYPERCHOLESTEROLEMIA, UNSPECIFIED: ICD-10-CM

## 2021-08-23 DIAGNOSIS — E78.5 HYPERLIPIDEMIA, UNSPECIFIED: ICD-10-CM

## 2021-08-23 PROCEDURE — 99214 OFFICE O/P EST MOD 30 MIN: CPT

## 2021-08-23 RX ORDER — FUROSEMIDE 20 MG/1
20 TABLET ORAL DAILY
Refills: 0 | Status: ACTIVE | COMMUNITY

## 2021-08-23 RX ORDER — OMEGA-3-ACID ETHYL ESTERS CAPSULES 1 G/1
1 CAPSULE, LIQUID FILLED ORAL TWICE DAILY
Refills: 0 | Status: ACTIVE | COMMUNITY

## 2021-08-23 RX ORDER — BACILLUS COAGULANS/INULIN 1B-250 MG
1-250 CAPSULE ORAL
Refills: 0 | Status: ACTIVE | COMMUNITY

## 2021-08-23 NOTE — ASSESSMENT
[FreeTextEntry1] : Patient with hx afib. She had cva because bleed . No off xarelto. On asa and plavix. She has memory issues. She has htn. Probable katarzyna. Told try lose weight. To get OT. She is bed to chair. Reviewed watchman. Family does not want now. Because would still need period on xarelto. Told if legs more swollen give extra lasix.

## 2021-08-23 NOTE — HISTORY OF PRESENT ILLNESS
[FreeTextEntry1] : Symptoms:      Patient has afib. She was on xarelto. 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 She has Memory issues. She gets ABDULLAHI. She i s bed chair..     Legs swollen. Getting pt. She completed ot                                                                                                                                                                                                                                                                                                                                                             \par \par \par Cardiac Risk Factors: (yes) hypertension, (no diabetes, yes) high LDL, (no/) low HDL, yes) stress, yes) obesity, (no/yes) sleep apnea, yes) peripheral vascular disease, (notobacco use, (yes sedentary lifestyle, (no/yes) FH cardiovascular disease.\par

## 2021-08-23 NOTE — PHYSICAL EXAM
[Well Developed] : well developed [Obese] : obese [JVP Elevated ___cm] : the JVP was not elevated [Normal Conjunctiva] : normal conjunctiva [Normal S1, S2] : normal S1, S2 [No Murmur] : no murmur [Clear Lung Fields] : clear lung fields [Soft] : abdomen soft [Normal Gait] : normal gait [No Rash] : no rash [Alert and Oriented] : alert and oriented [de-identified] : tr  - 1 + edemaedema [de-identified] : rt side weak

## 2021-09-08 DIAGNOSIS — F03.91 UNSPECIFIED DEMENTIA WITH BEHAVIORAL DISTURBANCE: ICD-10-CM

## 2021-09-08 RX ORDER — CITALOPRAM HYDROBROMIDE 10 MG/1
10 TABLET, FILM COATED ORAL DAILY
Qty: 30 | Refills: 6 | Status: ACTIVE | COMMUNITY
Start: 2021-06-11 | End: 1900-01-01

## 2021-11-01 PROBLEM — F03.91 DEMENTIA WITH BEHAVIORAL DISTURBANCE, UNSPECIFIED DEMENTIA TYPE: Status: ACTIVE | Noted: 2021-11-01

## 2021-11-01 RX ORDER — SERTRALINE 25 MG/1
25 TABLET, FILM COATED ORAL DAILY
Qty: 15 | Refills: 0 | Status: ACTIVE | COMMUNITY
Start: 2021-11-01 | End: 1900-01-01

## 2021-11-16 ENCOUNTER — APPOINTMENT (OUTPATIENT)
Dept: CARDIOLOGY | Facility: CLINIC | Age: 81
End: 2021-11-16

## 2022-01-27 ENCOUNTER — APPOINTMENT (OUTPATIENT)
Dept: NEUROLOGY | Facility: CLINIC | Age: 82
End: 2022-01-27

## 2024-12-23 NOTE — HISTORY OF PRESENT ILLNESS
PT BIB EMS from home for   Chief Complaint   Patient presents with    Flu Like Symptoms     Cough (unproductive), fever, nausea for past 3 days.    Hx rectal cancer, has urostomy and colostomy.     93% on 2L, 139 HR.     Blood in Urine     Red tinged urine    Abdominal Pain     Denies pain currently, low abd pain earlier today. Given 100mcg fentanyl       PT given 700ml NS in route, 100mcg fent.    PT took 1000mg of tylenol at 1600.   [FreeTextEntry1] : Symptoms:      Patient has afib. She was on xarelto. 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 She has Memory issues. She gets ABDULLAHI. She i s bed chair..                                                                                                                                                                                                                                                                                                                                                                  \par \par Denied: claudication, chest pain, dizziness, dyspnea at rest, edema, irregular heartbeat, lightheadedness, orthopnea, SOB, syncope, exertional, syncope at near, palpitations,                                                                                                                                              \par \par Lifestyle and Disease Management:  Weight Issues: She  (does have  weight concerns. Exercise: She  (does not) exercise regularly  Smoking: She (does /does not) use tobacco. Alcohol: She (does/ not drink\par \par Cardiac Risk Factors: (yes) hypertension, (no diabetes, yes) high LDL, (no/) low HDL, yes) stress, yes) obesity, (no/yes) sleep apnea, yes) peripheral vascular disease, (notobacco use, (yes sedentary lifestyle, (no/yes) FH cardiovascular disease.\par